# Patient Record
Sex: FEMALE | Race: WHITE | Employment: FULL TIME | URBAN - METROPOLITAN AREA
[De-identification: names, ages, dates, MRNs, and addresses within clinical notes are randomized per-mention and may not be internally consistent; named-entity substitution may affect disease eponyms.]

---

## 2021-01-19 LAB — HIV, EXTERNAL: NON REACTIVE

## 2021-05-27 LAB
HIV, EXTERNAL: NON REACTIVE
RUBELLA, EXTERNAL: NORMAL

## 2021-12-28 LAB — GRBS, EXTERNAL: NEGATIVE

## 2022-01-19 ENCOUNTER — ANESTHESIA EVENT (OUTPATIENT)
Dept: LABOR AND DELIVERY | Age: 32
End: 2022-01-19
Payer: COMMERCIAL

## 2022-01-19 ENCOUNTER — ANESTHESIA (OUTPATIENT)
Dept: LABOR AND DELIVERY | Age: 32
End: 2022-01-19
Payer: COMMERCIAL

## 2022-01-19 ENCOUNTER — HOSPITAL ENCOUNTER (OUTPATIENT)
Age: 32
Setting detail: OBSERVATION
Discharge: HOME | End: 2022-01-19
Attending: OBSTETRICS & GYNECOLOGY | Admitting: OBSTETRICS & GYNECOLOGY
Payer: COMMERCIAL

## 2022-01-19 ENCOUNTER — HOSPITAL ENCOUNTER (INPATIENT)
Age: 32
LOS: 2 days | Discharge: HOME | End: 2022-01-21
Attending: OBSTETRICS & GYNECOLOGY | Admitting: OBSTETRICS & GYNECOLOGY
Payer: COMMERCIAL

## 2022-01-19 VITALS
SYSTOLIC BLOOD PRESSURE: 130 MMHG | HEART RATE: 86 BPM | TEMPERATURE: 98.1 F | OXYGEN SATURATION: 100 % | BODY MASS INDEX: 21.77 KG/M2 | RESPIRATION RATE: 17 BRPM | DIASTOLIC BLOOD PRESSURE: 87 MMHG | WEIGHT: 147 LBS | HEIGHT: 69 IN

## 2022-01-19 PROBLEM — Z3A.40 40 WEEKS GESTATION OF PREGNANCY: Status: ACTIVE | Noted: 2022-01-19

## 2022-01-19 PROBLEM — Z33.1 IUP (INTRAUTERINE PREGNANCY), INCIDENTAL: Status: ACTIVE | Noted: 2022-01-19

## 2022-01-19 PROBLEM — Z37.9 NORMAL LABOR: Status: ACTIVE | Noted: 2022-01-19

## 2022-01-19 LAB
ABO + RH BLD: NORMAL
APPEARANCE UR: CLEAR
BASOPHILS # BLD: 0 K/UL (ref 0–0.1)
BASOPHILS NFR BLD: 0 % (ref 0–2)
BILIRUB UR QL: NEGATIVE
BLOOD GROUP ANTIBODIES SERPL: NORMAL
COLOR UR: YELLOW
DIFFERENTIAL METHOD BLD: ABNORMAL
EOSINOPHIL # BLD: 0 K/UL (ref 0–0.4)
EOSINOPHIL NFR BLD: 0 % (ref 0–5)
ERYTHROCYTE [DISTWIDTH] IN BLOOD BY AUTOMATED COUNT: 12.9 % (ref 11.6–14.5)
GLUCOSE UR QL STRIP.AUTO: NEGATIVE MG/DL
HCT VFR BLD AUTO: 39 % (ref 35–45)
HGB BLD-MCNC: 12.9 G/DL (ref 12–16)
IMM GRANULOCYTES # BLD AUTO: 0.1 K/UL (ref 0–0.04)
IMM GRANULOCYTES NFR BLD AUTO: 0 % (ref 0–0.5)
KETONES UR-MCNC: 80 MG/DL
LEUKOCYTE ESTERASE UR QL STRIP: NEGATIVE
LYMPHOCYTES # BLD: 1.4 K/UL (ref 0.9–3.6)
LYMPHOCYTES NFR BLD: 11 % (ref 21–52)
MCH RBC QN AUTO: 29.4 PG (ref 24–34)
MCHC RBC AUTO-ENTMCNC: 33.1 G/DL (ref 31–37)
MCV RBC AUTO: 88.8 FL (ref 78–100)
MONOCYTES # BLD: 0.8 K/UL (ref 0.05–1.2)
MONOCYTES NFR BLD: 6 % (ref 3–10)
NEUTS SEG # BLD: 10.2 K/UL (ref 1.8–8)
NEUTS SEG NFR BLD: 82 % (ref 40–73)
NITRITE UR QL: NEGATIVE
NRBC # BLD: 0 K/UL (ref 0–0.01)
NRBC BLD-RTO: 0 PER 100 WBC
PH UR: 6 [PH] (ref 5–9)
PLATELET # BLD AUTO: 223 K/UL (ref 135–420)
PMV BLD AUTO: 10.3 FL (ref 9.2–11.8)
PROT UR QL: NEGATIVE MG/DL
RBC # BLD AUTO: 4.39 M/UL (ref 4.2–5.3)
RBC # UR STRIP: NEGATIVE /UL
SERVICE CMNT-IMP: ABNORMAL
SP GR UR: >1.03 (ref 1–1.02)
SPECIMEN EXP DATE BLD: NORMAL
UROBILINOGEN UR QL: 0.2 EU/DL (ref 0.2–1)
WBC # BLD AUTO: 12.4 K/UL (ref 4.6–13.2)

## 2022-01-19 PROCEDURE — 74011000258 HC RX REV CODE- 258

## 2022-01-19 PROCEDURE — 81003 URINALYSIS AUTO W/O SCOPE: CPT

## 2022-01-19 PROCEDURE — 76060000078 HC EPIDURAL ANESTHESIA

## 2022-01-19 PROCEDURE — 86900 BLOOD TYPING SEROLOGIC ABO: CPT

## 2022-01-19 PROCEDURE — 74011250636 HC RX REV CODE- 250/636: Performed by: ADVANCED PRACTICE MIDWIFE

## 2022-01-19 PROCEDURE — 74011250636 HC RX REV CODE- 250/636: Performed by: ANESTHESIOLOGY

## 2022-01-19 PROCEDURE — 10907ZC DRAINAGE OF AMNIOTIC FLUID, THERAPEUTIC FROM PRODUCTS OF CONCEPTION, VIA NATURAL OR ARTIFICIAL OPENING: ICD-10-PCS | Performed by: OBSTETRICS & GYNECOLOGY

## 2022-01-19 PROCEDURE — 99285 EMERGENCY DEPT VISIT HI MDM: CPT

## 2022-01-19 PROCEDURE — 65270000029 HC RM PRIVATE

## 2022-01-19 PROCEDURE — 74011250637 HC RX REV CODE- 250/637: Performed by: OBSTETRICS & GYNECOLOGY

## 2022-01-19 PROCEDURE — 74011250636 HC RX REV CODE- 250/636

## 2022-01-19 PROCEDURE — 74011000250 HC RX REV CODE- 250: Performed by: ANESTHESIOLOGY

## 2022-01-19 PROCEDURE — 85025 COMPLETE CBC W/AUTO DIFF WBC: CPT

## 2022-01-19 PROCEDURE — 75410000000 HC DELIVERY VAGINAL/SINGLE

## 2022-01-19 PROCEDURE — 0UQGXZZ REPAIR VAGINA, EXTERNAL APPROACH: ICD-10-PCS | Performed by: OBSTETRICS & GYNECOLOGY

## 2022-01-19 PROCEDURE — 0UQMXZZ REPAIR VULVA, EXTERNAL APPROACH: ICD-10-PCS | Performed by: OBSTETRICS & GYNECOLOGY

## 2022-01-19 PROCEDURE — 74011000258 HC RX REV CODE- 258: Performed by: ANESTHESIOLOGY

## 2022-01-19 PROCEDURE — 77030007879 HC KT SPN EPDRL TELE -B: Performed by: ANESTHESIOLOGY

## 2022-01-19 PROCEDURE — 00HU33Z INSERTION OF INFUSION DEVICE INTO SPINAL CANAL, PERCUTANEOUS APPROACH: ICD-10-PCS | Performed by: ANESTHESIOLOGY

## 2022-01-19 PROCEDURE — 74011000250 HC RX REV CODE- 250: Performed by: ADVANCED PRACTICE MIDWIFE

## 2022-01-19 PROCEDURE — 75410000003 HC RECOV DEL/VAG/CSECN EA 0.5 HR

## 2022-01-19 PROCEDURE — 75410000002 HC LABOR FEE PER 1 HR

## 2022-01-19 PROCEDURE — 74011250636 HC RX REV CODE- 250/636: Performed by: OBSTETRICS & GYNECOLOGY

## 2022-01-19 RX ORDER — ONDANSETRON 2 MG/ML
4 INJECTION INTRAMUSCULAR; INTRAVENOUS
Status: DISCONTINUED | OUTPATIENT
Start: 2022-01-19 | End: 2022-01-20 | Stop reason: HOSPADM

## 2022-01-19 RX ORDER — DIPHENHYDRAMINE HYDROCHLORIDE 50 MG/ML
25 INJECTION, SOLUTION INTRAMUSCULAR; INTRAVENOUS
Status: DISCONTINUED | OUTPATIENT
Start: 2022-01-19 | End: 2022-01-20 | Stop reason: HOSPADM

## 2022-01-19 RX ORDER — FENTANYL CITRATE 50 UG/ML
100 INJECTION, SOLUTION INTRAMUSCULAR; INTRAVENOUS ONCE
Status: DISCONTINUED | OUTPATIENT
Start: 2022-01-19 | End: 2022-01-20 | Stop reason: HOSPADM

## 2022-01-19 RX ORDER — FACIAL-BODY WIPES
10 EACH TOPICAL DAILY PRN
Status: CANCELLED | OUTPATIENT
Start: 2022-01-19

## 2022-01-19 RX ORDER — SIMETHICONE 80 MG
80 TABLET,CHEWABLE ORAL
Status: CANCELLED | OUTPATIENT
Start: 2022-01-19

## 2022-01-19 RX ORDER — NALOXONE HYDROCHLORIDE 0.4 MG/ML
0.2 INJECTION, SOLUTION INTRAMUSCULAR; INTRAVENOUS; SUBCUTANEOUS AS NEEDED
Status: DISCONTINUED | OUTPATIENT
Start: 2022-01-19 | End: 2022-01-20 | Stop reason: HOSPADM

## 2022-01-19 RX ORDER — SODIUM CHLORIDE, SODIUM LACTATE, POTASSIUM CHLORIDE, CALCIUM CHLORIDE 600; 310; 30; 20 MG/100ML; MG/100ML; MG/100ML; MG/100ML
125 INJECTION, SOLUTION INTRAVENOUS CONTINUOUS
Status: DISCONTINUED | OUTPATIENT
Start: 2022-01-19 | End: 2022-01-20 | Stop reason: HOSPADM

## 2022-01-19 RX ORDER — FENTANYL CITRATE 50 UG/ML
INJECTION, SOLUTION INTRAMUSCULAR; INTRAVENOUS AS NEEDED
Status: DISCONTINUED | OUTPATIENT
Start: 2022-01-19 | End: 2022-01-19 | Stop reason: HOSPADM

## 2022-01-19 RX ORDER — FENTANYL CITRATE 50 UG/ML
INJECTION, SOLUTION INTRAMUSCULAR; INTRAVENOUS
Status: COMPLETED
Start: 2022-01-19 | End: 2022-01-19

## 2022-01-19 RX ORDER — BUTORPHANOL TARTRATE 2 MG/ML
2 INJECTION INTRAMUSCULAR; INTRAVENOUS
Status: DISCONTINUED | OUTPATIENT
Start: 2022-01-19 | End: 2022-01-20 | Stop reason: HOSPADM

## 2022-01-19 RX ORDER — LIDOCAINE HYDROCHLORIDE AND EPINEPHRINE 15; 5 MG/ML; UG/ML
INJECTION, SOLUTION EPIDURAL AS NEEDED
Status: DISCONTINUED | OUTPATIENT
Start: 2022-01-19 | End: 2022-01-19 | Stop reason: HOSPADM

## 2022-01-19 RX ORDER — OXYTOCIN/RINGER'S LACTATE 30/500 ML
10 PLASTIC BAG, INJECTION (ML) INTRAVENOUS AS NEEDED
Status: COMPLETED | OUTPATIENT
Start: 2022-01-19 | End: 2022-01-19

## 2022-01-19 RX ORDER — MINERAL OIL
30 OIL (ML) ORAL AS NEEDED
Status: COMPLETED | OUTPATIENT
Start: 2022-01-19 | End: 2022-01-19

## 2022-01-19 RX ORDER — SODIUM CHLORIDE 0.9 % (FLUSH) 0.9 %
5-40 SYRINGE (ML) INJECTION AS NEEDED
Status: DISCONTINUED | OUTPATIENT
Start: 2022-01-19 | End: 2022-01-20 | Stop reason: HOSPADM

## 2022-01-19 RX ORDER — LANOLIN ALCOHOL/MO/W.PET/CERES
3 CREAM (GRAM) TOPICAL
Status: CANCELLED | OUTPATIENT
Start: 2022-01-19

## 2022-01-19 RX ORDER — IBUPROFEN 400 MG/1
800 TABLET ORAL
Status: CANCELLED | OUTPATIENT
Start: 2022-01-22

## 2022-01-19 RX ORDER — SODIUM CHLORIDE 0.9 % (FLUSH) 0.9 %
5-40 SYRINGE (ML) INJECTION EVERY 8 HOURS
Status: CANCELLED | OUTPATIENT
Start: 2022-01-19

## 2022-01-19 RX ORDER — FENTANYL/ROPIVACAINE/NS/PF 2MCG/ML-.1
1-22 PLASTIC BAG, INJECTION (ML) EPIDURAL
Status: DISCONTINUED | OUTPATIENT
Start: 2022-01-19 | End: 2022-01-20 | Stop reason: HOSPADM

## 2022-01-19 RX ORDER — OXYTOCIN/RINGER'S LACTATE 30/500 ML
87.3 PLASTIC BAG, INJECTION (ML) INTRAVENOUS AS NEEDED
Status: CANCELLED | OUTPATIENT
Start: 2022-01-19

## 2022-01-19 RX ORDER — SODIUM CHLORIDE 0.9 % (FLUSH) 0.9 %
5-40 SYRINGE (ML) INJECTION EVERY 8 HOURS
Status: DISCONTINUED | OUTPATIENT
Start: 2022-01-19 | End: 2022-01-20 | Stop reason: HOSPADM

## 2022-01-19 RX ORDER — OXYTOCIN/0.9 % SODIUM CHLORIDE 30/500 ML
87.3 PLASTIC BAG, INJECTION (ML) INTRAVENOUS AS NEEDED
Status: COMPLETED | OUTPATIENT
Start: 2022-01-19 | End: 2022-01-19

## 2022-01-19 RX ORDER — ACETAMINOPHEN 325 MG/1
650 TABLET ORAL
Status: CANCELLED | OUTPATIENT
Start: 2022-01-19

## 2022-01-19 RX ORDER — NALBUPHINE HYDROCHLORIDE 10 MG/ML
10 INJECTION, SOLUTION INTRAMUSCULAR; INTRAVENOUS; SUBCUTANEOUS
Status: DISCONTINUED | OUTPATIENT
Start: 2022-01-19 | End: 2022-01-20 | Stop reason: HOSPADM

## 2022-01-19 RX ORDER — HYDROMORPHONE HYDROCHLORIDE 1 MG/ML
1 INJECTION, SOLUTION INTRAMUSCULAR; INTRAVENOUS; SUBCUTANEOUS
Status: DISCONTINUED | OUTPATIENT
Start: 2022-01-19 | End: 2022-01-20 | Stop reason: HOSPADM

## 2022-01-19 RX ORDER — OXYTOCIN/RINGER'S LACTATE 30/500 ML
10 PLASTIC BAG, INJECTION (ML) INTRAVENOUS AS NEEDED
Status: CANCELLED | OUTPATIENT
Start: 2022-01-19

## 2022-01-19 RX ORDER — VITAMIN A ACETATE, BETA CAROTENE, ASCORBIC ACID, CHOLECALCIFEROL, .ALPHA.-TOCOPHEROL ACETATE, DL-, THIAMINE MONONITRATE, RIBOFLAVIN, NIACINAMIDE, PYRIDOXINE HYDROCHLORIDE, FOLIC ACID, CYANOCOBALAMIN, CALCIUM CARBONATE, FERROUS FUMARATE, ZINC OXIDE, CUPRIC OXIDE 3080; 12; 120; 400; 1; 1.84; 3; 20; 22; 920; 25; 200; 27; 10; 2 [IU]/1; UG/1; MG/1; [IU]/1; MG/1; MG/1; MG/1; MG/1; MG/1; [IU]/1; MG/1; MG/1; MG/1; MG/1; MG/1
1 TABLET, FILM COATED ORAL DAILY
COMMUNITY

## 2022-01-19 RX ORDER — OXYCODONE AND ACETAMINOPHEN 5; 325 MG/1; MG/1
1-2 TABLET ORAL
Status: CANCELLED | OUTPATIENT
Start: 2022-01-19

## 2022-01-19 RX ORDER — PROMETHAZINE HYDROCHLORIDE 25 MG/ML
25 INJECTION, SOLUTION INTRAMUSCULAR; INTRAVENOUS
Status: CANCELLED | OUTPATIENT
Start: 2022-01-19

## 2022-01-19 RX ORDER — SODIUM CHLORIDE, SODIUM LACTATE, POTASSIUM CHLORIDE, CALCIUM CHLORIDE 600; 310; 30; 20 MG/100ML; MG/100ML; MG/100ML; MG/100ML
125 INJECTION, SOLUTION INTRAVENOUS CONTINUOUS
Status: CANCELLED | OUTPATIENT
Start: 2022-01-19 | End: 2022-01-20

## 2022-01-19 RX ORDER — ETHYL ALCOHOL 70 %
SOLUTION, NON-ORAL TOPICAL AS NEEDED
COMMUNITY

## 2022-01-19 RX ORDER — PHENYLEPHRINE HCL IN 0.9% NACL 1 MG/10 ML
80 SYRINGE (ML) INTRAVENOUS AS NEEDED
Status: DISCONTINUED | OUTPATIENT
Start: 2022-01-19 | End: 2022-01-20 | Stop reason: HOSPADM

## 2022-01-19 RX ORDER — FENTANYL/ROPIVACAINE/NS/PF 2MCG/ML-.1
PLASTIC BAG, INJECTION (ML) EPIDURAL
Status: COMPLETED
Start: 2022-01-19 | End: 2022-01-19

## 2022-01-19 RX ORDER — METHYLERGONOVINE MALEATE 0.2 MG/ML
0.2 INJECTION INTRAVENOUS AS NEEDED
Status: DISCONTINUED | OUTPATIENT
Start: 2022-01-19 | End: 2022-01-20 | Stop reason: HOSPADM

## 2022-01-19 RX ORDER — NALBUPHINE HYDROCHLORIDE 10 MG/ML
2.5 INJECTION, SOLUTION INTRAMUSCULAR; INTRAVENOUS; SUBCUTANEOUS
Status: DISCONTINUED | OUTPATIENT
Start: 2022-01-19 | End: 2022-01-20 | Stop reason: HOSPADM

## 2022-01-19 RX ORDER — LIDOCAINE HYDROCHLORIDE 10 MG/ML
20 INJECTION, SOLUTION EPIDURAL; INFILTRATION; INTRACAUDAL; PERINEURAL AS NEEDED
Status: DISCONTINUED | OUTPATIENT
Start: 2022-01-19 | End: 2022-01-20 | Stop reason: HOSPADM

## 2022-01-19 RX ORDER — KETOROLAC TROMETHAMINE 30 MG/ML
30 INJECTION, SOLUTION INTRAMUSCULAR; INTRAVENOUS EVERY 6 HOURS
Status: CANCELLED | OUTPATIENT
Start: 2022-01-19 | End: 2022-01-21

## 2022-01-19 RX ORDER — SODIUM CHLORIDE 0.9 % (FLUSH) 0.9 %
5-40 SYRINGE (ML) INJECTION AS NEEDED
Status: CANCELLED | OUTPATIENT
Start: 2022-01-19

## 2022-01-19 RX ORDER — TERBUTALINE SULFATE 1 MG/ML
0.25 INJECTION SUBCUTANEOUS
Status: DISCONTINUED | OUTPATIENT
Start: 2022-01-19 | End: 2022-01-20 | Stop reason: HOSPADM

## 2022-01-19 RX ADMIN — ROPIVACAINE HYDROCHLORIDE 15 ML/HR: 5 INJECTION, SOLUTION EPIDURAL; INFILTRATION; PERINEURAL at 21:13

## 2022-01-19 RX ADMIN — Medication 10000 MILLI-UNITS: at 22:58

## 2022-01-19 RX ADMIN — ROPIVACAINE HYDROCHLORIDE 15 ML/HR: 5 INJECTION, SOLUTION EPIDURAL; INFILTRATION; PERINEURAL at 15:27

## 2022-01-19 RX ADMIN — LIDOCAINE HYDROCHLORIDE,EPINEPHRINE BITARTRATE 4 ML: 15; .005 INJECTION, SOLUTION EPIDURAL; INFILTRATION; INTRACAUDAL; PERINEURAL at 17:24

## 2022-01-19 RX ADMIN — FENTANYL CITRATE 100 MCG: 50 INJECTION, SOLUTION INTRAMUSCULAR; INTRAVENOUS at 17:25

## 2022-01-19 RX ADMIN — MINERAL OIL 30 ML: 1000 SOLUTION ORAL at 23:27

## 2022-01-19 RX ADMIN — Medication 87.3 MILLI-UNITS/MIN: at 23:08

## 2022-01-19 RX ADMIN — FAMOTIDINE 20 MG: 10 INJECTION, SOLUTION INTRAVENOUS at 20:27

## 2022-01-19 NOTE — H&P
Ostetrical History and Physical    Subjective:     Date of Admission: 2022    Patient is a 36 y.o.   female admitted with normal labor at 40w. .    For Obstetric history, see eunice.    For Review of Systems, see prenatal    Past Medical History:   Diagnosis Date    H/O wisdom tooth extraction       No past surgical history on file. Prior to Admission medications    Medication Sig Start Date End Date Taking? Authorizing Provider   vitamin a & d (A&D) ointment Apply  to affected area as needed for Skin Irritation. Provider, Historical   fexofenadine (ALLEGRA) 180 mg tablet Take  by mouth. Patient not taking: Reported on 2022    Provider, Historical   drospirenone-ethinyl estradiol (QUIN) 3-0.02 mg tab take 1 tablet by mouth daily  Patient not taking: Reported on 2022 8/15/16   Swati Parikh MD     No Known Allergies   Social History     Tobacco Use    Smoking status: Never Smoker    Smokeless tobacco: Never Used   Substance Use Topics    Alcohol use: Yes     Comment: once in awhile      Family History   Problem Relation Age of Onset    Diabetes Maternal Grandfather     Heart Disease Maternal Grandfather     Hypertension Maternal Grandfather           Objective:     Blood pressure 94/60, pulse 84, temperature 98.1 °F (36.7 °C), resp. rate 16, SpO2 100 %. Temp (24hrs), Av.1 °F (36.7 °C), Min:98.1 °F (36.7 °C), Max:98.1 °F (36.7 °C)        No intake/output data recorded. No intake/output data recorded. HEENT: No pallor, no jaundice, Thyroid and throat normal  RESPIRATORY: Clear to A & P  CVS: pulse reg, HS normal  ABDOMEN: Gravid. Vertex. EFW=6.5lb +-1lb. No abnormal tenderness.    Pelvic: Cervix 4,   Effaced: 100%  Station:  -3  Data Review:   Recent Results (from the past 24 hour(s))   POC URINE MACROSCOPIC    Collection Time: 22  3:08 AM   Result Value Ref Range    Color YELLOW      Appearance CLEAR      Spec. gravity (POC) >1.030 (H) 1.001 - 1.023    pH, urine (POC) 6.0 5.0 - 9.0      Protein (POC) Negative NEG mg/dL    Glucose, urine (POC) Negative NEG mg/dL    Ketones (POC) 80 (A) NEG mg/dL    Bilirubin (POC) Negative NEG      Blood (POC) Negative NEG      Urobilinogen (POC) 0.2 0.2 - 1.0 EU/dL    Nitrite (POC) Negative NEG      Leukocyte esterase (POC) Negative NEG      Performed by [employer]      Monitor:  Reactivity:present Variability:present Baseline:within normal limits    Assessment:     Active Problems:    40 weeks gestation of pregnancy (2022)      Normal labor (2022)        Plan:       Check labs:  CBC  Check  Prenatal:    Disposition:     Type of admit:In-Patient    I saw and examined patient.     Signed By: Darcy Boyd MD                         2022

## 2022-01-19 NOTE — PROGRESS NOTES
36 y.o. pt at 40w 2d presents to L&D from office with contractions. EFM and [place] applied. IV started, labs drawn. Pt states she desires an epidural, bolus infusing. 1320: Dr. Hamlet Ely at bedside, SVE 5-6/100/0.     1445: Pt requesting epidural.     1504: Dr. Nikolas Gilmore pageyordy. 1511: Dr. Lexii Cisse at bedside discussing epidural procedure. 1517: Time out  1518: Start  1524: Catheter in, needle out  1525: Fentanyl to MD   1525: Test dose  1525: Bolus dose  1528: Finish     2491: Pt positioned on back with right hip bump. EFM and [place] adjusted. 1615: Pt turned left lateral.     1702: Dr. Hamlet Ely at bedside. AROM clear fluid. 1705: Pt placed on bedpan. 1708: Pt removed from bedpan, unable to void at this time. Plan to straight cath. 1755: Straight catheter 200ml. Pt does not want to be checked at this time. Pt placed in high fowlers position. EFM and [place] adjusted. 1808: Pt complaining of pain on right side, pt turned right lateral. EFM and [place] adjusted. 1823: CICI Arceo CNM at bedside, SVE  9.5/100/+1. Pt placed semi fowlers. : Bedside and Verbal shift change report given to LURDES Ramos (oncoming nurse) by BENNETT Silva RN (offgoing nurse). Report included the following information SBAR, Intake/Output, MAR and Recent Results.

## 2022-01-19 NOTE — PROGRESS NOTES
Labor Progress Note    Patient seen, fetal heart rate and contraction pattern evaluated. Physical Exam:  Pelvic: Cervix ant lip,   Effaced: 100%  Station:  +1     clear fluid noted on exam  Contractions: Every 2-4 minutes,60-90, severe  Fetal Heart Rate: Baseline: 130 per minute  Variability: moderate  Accelerations: yes  Decelerations: late and early  Cat II FHR strip    Assessment:  Active phase labor. and Adequate uterine activity - intensity and frequency.     PLAN:  Reassuring fetal status, Labor  Progressing normally  Continue expectant management, Continue plan for vaginal delivery    Juan Carlos Alfonso CNM  1/19/2022  6:35 PM

## 2022-01-19 NOTE — ANESTHESIA PREPROCEDURE EVALUATION
Relevant Problems   No relevant active problems       Anesthetic History   No history of anesthetic complications            Review of Systems / Medical History  Patient summary reviewed, nursing notes reviewed and pertinent labs reviewed    Pulmonary                   Neuro/Psych   Within defined limits           Cardiovascular                  Exercise tolerance: >4 METS     GI/Hepatic/Renal  Within defined limits              Endo/Other  Within defined limits           Other Findings              Physical Exam    Airway  Mallampati: II  TM Distance: 4 - 6 cm  Neck ROM: normal range of motion   Mouth opening: Normal     Cardiovascular  Regular rate and rhythm,  S1 and S2 normal,  no murmur, click, rub, or gallop             Dental  No notable dental hx       Pulmonary  Breath sounds clear to auscultation               Abdominal  GI exam deferred       Other Findings            Anesthetic Plan    ASA: 2  Anesthesia type: epidural  Risk and benefits fully explained to the patient including bleeding, headache, nerve damage, infection, nausea, back pain, and hemodynamic changes. Patient understands and agrees to the procedure.     Post-op pain plan if not by surgeon: indwelling epidural catheter      Anesthetic plan and risks discussed with: Patient

## 2022-01-19 NOTE — PROGRESS NOTES
Dr. Delym Massey paged for epidural.     1600- Charting reviewed for BENNETT Booker, [address] and notes reviewed for BENNETT Booker, RN. Bedside and Verbal shift change report given to LURDES Roy and JULIETTE Trinidad (oncoming nurse) by Kristofer Schmidt RN (offgoing nurse). Report included the following information SBAR, Intake/Output, MAR and Recent Results.

## 2022-01-19 NOTE — ANESTHESIA PROCEDURE NOTES
Epidural Block    Patient location during procedure: OB  Start time: 1/19/2022 3:18 PM  End time: 1/19/2022 3:25 PM  Reason for block: labor epidural  Staffing  Performed: attending   Anesthesiologist: Danyelle Keane MD  Preanesthetic Checklist  Completed: patient identified, IV checked, site marked, risks and benefits discussed, surgical consent, monitors and equipment checked, pre-op evaluation and timeout performed  Block Placement  Patient position: sitting  Prep: ChloraPrep  Sterility prep: cap, drape, gloves, hand and mask  Sedation level: no sedation  Patient monitoring: frequent blood pressure checks  Approach: midline  Location: lumbar  Lumbar location: L2-L3  Epidural  Loss of resistance technique: saline  Guidance: landmark technique  Needle  Needle type: Tuohy   Needle gauge: 17 G  Needle length: 9 cm  Needle insertion depth: 21 cm  Catheter type: end hole  Catheter size: 20 G  Catheter at skin depth: 21 cm  Catheter securement method: clear occlusive dressing and surgical tape  Test dose: negative  Assessment  Block outcome: pain improved  Number of attempts: 1  Procedure assessment: patient tolerated procedure well with no immediate complications

## 2022-01-19 NOTE — PROGRESS NOTES
[phone] Paged  JULIETTE Bueno [facility] to update on Pt's admission and status. Orders received for oral hydration and keep monitoring. [phone] Paged [address] updating on pt's status and no change in cervix, reassuring fetal monitor pattern. Discharge orders received   I have reviewed verbal and written orders discharge instructions with the patient. The patient verbalized understanding.

## 2022-01-19 NOTE — PROGRESS NOTES
Epidural in and working    Monitor:  Reactivity:present Variability:present Baseline:within normal limits  Contractions q 3    Vitals:  Blood pressure 122/85, pulse 80, temperature 98.1 °F (36.7 °C), resp. rate 16, SpO2 100 %.      Pelvic exam:  Cervix 7 BOW bulging    AROM, clear    Cervix now 7cm, well applied   Effaced: 100%Station: +1       Plan:           Signed By: Miko aWlker MD                         2022

## 2022-01-19 NOTE — PROGRESS NOTES
HPI:    Subjective:contractions reported, denies VB or LOF, reports good FM     Sherrell Lopez, a 36 y.o.  G1 at 40w2d presents to L&D with c/o see above. Assessment:  Past Medical History:   Diagnosis Date    H/O wisdom tooth extraction      No past surgical history on file. No Known Allergies  Prior to Admission medications    Medication Sig Start Date End Date Taking? Authorizing Provider   vitamin a & d (A&D) ointment Apply  to affected area as needed for Skin Irritation. Yes Provider, Historical   fexofenadine (ALLEGRA) 180 mg tablet Take  by mouth. Patient not taking: Reported on 1/19/2022    Provider, Historical   drospirenone-ethinyl estradiol (QUIN) 3-0.02 mg tab take 1 tablet by mouth daily  Patient not taking: Reported on 1/19/2022 8/15/16   Geraldine Colindres MD        Objective:     Vitals:  Vitals:    01/19/22 0302 01/19/22 0308 01/19/22 0315   Pulse: 83     Resp: 17     Temp: 98.1 °F (36.7 °C)     SpO2: 100%  98%   Weight:  66.7 kg (147 lb)    Height:  5' 9\" (1.753 m)         Physical Exam:  Patient without distress. Abdomen: soft, nontender  Fundus: soft and non tender  Perineum: blood absent, amniotic fluid absent  Cervical Exam: 2 cm dilated    80% effaced    -2 station    Presenting Part: cephalic  Cervical Position: mid position  Consistency: Medium  Membranes:  Intact  Fetal Heart Rate: Cat I fetal tracing   Baseline: 120 per minute  Variability: moderate  Accelerations: yes  Decelerations: none  Uterine contractions: irregular, every 2-6 minutes  Uterine irritability: no  Cervical exam: Dilated:  2 cm                             Effacement: 80 %                             Station: -2  U/A: Urine dipstick shows negative for all components. S.G. 1.030    Assessment/Plan: no e/o active labor, reassuring fetal status     Plan: DC home with standard & ER labor precautions. Follow-up in office for routine visit.      Signed By:  Daniel Cruz CNM     January 19, 2022

## 2022-01-20 LAB
HCT VFR BLD AUTO: 35.2 % (ref 35–45)
HGB BLD-MCNC: 11.7 G/DL (ref 12–16)

## 2022-01-20 PROCEDURE — 36415 COLL VENOUS BLD VENIPUNCTURE: CPT

## 2022-01-20 PROCEDURE — 85018 HEMOGLOBIN: CPT

## 2022-01-20 PROCEDURE — 74011250637 HC RX REV CODE- 250/637: Performed by: ADVANCED PRACTICE MIDWIFE

## 2022-01-20 PROCEDURE — 65270000029 HC RM PRIVATE

## 2022-01-20 RX ORDER — LANOLIN ALCOHOL/MO/W.PET/CERES
3 CREAM (GRAM) TOPICAL
Status: DISCONTINUED | OUTPATIENT
Start: 2022-01-20 | End: 2022-01-21 | Stop reason: HOSPADM

## 2022-01-20 RX ORDER — IBUPROFEN 400 MG/1
800 TABLET ORAL 3 TIMES DAILY
Status: DISCONTINUED | OUTPATIENT
Start: 2022-01-20 | End: 2022-01-21 | Stop reason: HOSPADM

## 2022-01-20 RX ORDER — AMOXICILLIN 250 MG
1 CAPSULE ORAL
Status: DISCONTINUED | OUTPATIENT
Start: 2022-01-20 | End: 2022-01-21 | Stop reason: HOSPADM

## 2022-01-20 RX ORDER — ACETAMINOPHEN 325 MG/1
650 TABLET ORAL
Status: DISCONTINUED | OUTPATIENT
Start: 2022-01-20 | End: 2022-01-21 | Stop reason: HOSPADM

## 2022-01-20 RX ORDER — PROMETHAZINE HYDROCHLORIDE 25 MG/ML
25 INJECTION, SOLUTION INTRAMUSCULAR; INTRAVENOUS
Status: DISCONTINUED | OUTPATIENT
Start: 2022-01-20 | End: 2022-01-21 | Stop reason: HOSPADM

## 2022-01-20 RX ADMIN — ACETAMINOPHEN 650 MG: 325 TABLET ORAL at 07:57

## 2022-01-20 RX ADMIN — IBUPROFEN 800 MG: 400 TABLET, FILM COATED ORAL at 23:31

## 2022-01-20 RX ADMIN — IBUPROFEN 800 MG: 400 TABLET, FILM COATED ORAL at 15:38

## 2022-01-20 RX ADMIN — IBUPROFEN 800 MG: 400 TABLET, FILM COATED ORAL at 03:33

## 2022-01-20 RX ADMIN — IBUPROFEN 800 MG: 400 TABLET, FILM COATED ORAL at 09:38

## 2022-01-20 NOTE — PROGRESS NOTES
Problem: Vaginal Delivery: Day of Deliver-Laboring  Goal: Consults, if ordered  Outcome: Progressing Towards Goal  Goal: Discharge Planning  Outcome: Progressing Towards Goal     Problem: Pain  Goal: *Control of Pain  Outcome: Progressing Towards Goal     Problem: Falls - Risk of  Goal: *Absence of Falls  Description: Document Norbert Fall Risk and appropriate interventions in the flowsheet.   Outcome: Progressing Towards Goal  Note: Fall Risk Interventions:                                Problem: Patient Education: Go to Patient Education Activity  Goal: Patient/Family Education  Outcome: Progressing Towards Goal     Problem: Vaginal Delivery: Day of Delivery-Post delivery  Goal: Activity/Safety  Outcome: Progressing Towards Goal  Goal: Consults, if ordered  Outcome: Progressing Towards Goal  Goal: Nutrition/Diet  Outcome: Progressing Towards Goal  Goal: Discharge Planning  Outcome: Progressing Towards Goal  Goal: Medications  Outcome: Progressing Towards Goal  Goal: Treatments/Interventions/Procedures  Outcome: Progressing Towards Goal  Goal: *Vital signs within defined limits  Outcome: Progressing Towards Goal  Goal: *Labs within defined limits  Outcome: Progressing Towards Goal  Goal: *Hemodynamically stable  Outcome: Progressing Towards Goal  Goal: *Optimal pain control at patient's stated goal  Outcome: Progressing Towards Goal  Goal: *Participates in infant care  Outcome: Progressing Towards Goal  Goal: *Demonstrates progressive activity  Outcome: Progressing Towards Goal  Goal: *Tolerating diet  Outcome: Progressing Towards Goal

## 2022-01-20 NOTE — PROGRESS NOTES
0300 TRANSFER - IN REPORT:    Verbal report received from Vernon Lambert RN (name) on Marie Madrigal  being received from labor and delivery (unit) for routine progression of care      Report consisted of patients Situation, Background, Assessment and   Recommendations(SBAR). Information from the following report(s) SBAR, Kardex, Intake/Output and MAR was reviewed with the receiving nurse. Opportunity for questions and clarification was provided. Assessment completed upon patients arrival to unit and care assumed. 6783 Pt. Joined at bedside by significant other and baby. AAOx4. Vital signs stable. Will continue to monitor. Pain 5/10. Educated on pain management. Pain medication administered as ordered. Whiteboard updated. Oriented to unit and room. Educated on plan of care and signs and symptoms to report. No further questions on concerns at this time. Fundus firm at U -2, smal rubra lochia. No clots noted. Assessment complete. Assisted to bathroom, steady gait. Voided. Educated on and assisted with anika care. Callbell within reach. Bed in lowest position. 0443 Pt resting comfortably in bed.     0615 Pt resting in bed comfortably. Educated on feeding frequency for infant. 0715 Bedside and Verbal shift change report given to layne Carcamo Rn & JULIETTE aGines RN (oncoming nurse) by MIREILLE Donald (offgoing nurse). Report included the following information SBAR, Kardex, Intake/Output, MAR and Recent Results.

## 2022-01-20 NOTE — PROGRESS NOTES
1056: Rounded on pt. Pain reassessment completed. Pt in bed w/ no needs at this time. 6182: Shift assessment completed. Motrin administered. 9342: Breast feeding assistance provided. Infant latched at this time. 1100: infant taken to nursery for miroslava assessment. 1108: Infant returned to mother's room. 1450: Rounded on pt. Pt states pain is well controlled. No needs at this time. 1538: Reassessment completed. Motrin administered. Fundus firm, midline umbilicus -3.     7253: No needs at this time. Pt states pain is well controlled.

## 2022-01-20 NOTE — ANESTHESIA POSTPROCEDURE EVALUATION
*    1/20/2022  2:18 PM    Laboring Epidural Follow-up Note     Referring physician: Maegan Mehta MD   Patient status post vaginal delivery with labor epidural    Visit Vitals  /76 (BP 1 Location: Right upper arm, BP Patient Position: At rest;Sitting)   Pulse 66   Temp 36.4 °C (97.5 °F)   Resp 18   SpO2 100%   Breastfeeding Unknown       Epidural removed by L&D staff  No sedation, pruritis noted. Adequate analgesia. No obvious anesthesia complications    Pt checked via phone per Covid protocol.           Jessica Pichardo CRNA

## 2022-01-20 NOTE — PROGRESS NOTES
Progress Note    Patient: Jin Fofana MRN: [id number]  SSN: [id number]    YOB: 1990  Age: 36 y.o. Sex: female    ROOM:  [facility]/      Subjective:     Postpartum Day: 1            Delivery: vaginal delivery    The patient feels well. The patient denies emotional concerns. The baby is well. Baby is feeding via breast.  The patient is ambulating well. The patient  tolerating a normal diet. Flatus has been passed.     Objective:      Patient Vitals for the past 24 hrs:   BP Temp Pulse Resp SpO2   01/20/22 0257 123/76 98.3 °F (36.8 °C) 82 16 100 %   01/20/22 0145 126/70  85     01/20/22 0130 118/73  83     01/20/22 0115 124/79  87     01/20/22 0100 108/75  86     01/20/22 0045 125/60  (!) 108     01/20/22 0030 125/76  85     01/20/22 0015 122/81  87     01/20/22 0000 126/75  81     01/19/22 2345 133/81  80     01/19/22 2336 122/75  86     01/19/22 2300 124/74       01/19/22 2200 (!) 141/99  (!) 112  99 %   01/19/22 2100 139/76  89     01/19/22 2000 125/86  (!) 117     01/19/22 1930 117/75  97     01/19/22 1922     100 %   01/19/22 1919 123/87  (!) 103     01/19/22 1917 123/87 99.5 °F (37.5 °C) (!) 110 14 100 %   01/19/22 1912     100 %   01/19/22 1907     100 %   01/19/22 1902     100 %   01/19/22 1900 116/73  (!) 101     01/19/22 1857     100 %   01/19/22 1852     100 %   01/19/22 1847     100 %   01/19/22 1842     100 %   01/19/22 1837     100 %   01/19/22 1832     100 %   01/19/22 1830 122/79  88     01/19/22 1827     100 %   01/19/22 1822     99 %   01/19/22 1817     100 %   01/19/22 1812     100 %   01/19/22 1807     99 %   01/19/22 1802     100 %   01/19/22 1800 122/82  85     01/19/22 1757     100 %   01/19/22 1752     100 %   01/19/22 1747     100 %   01/19/22 1745 130/79  (!) 103     01/19/22 1742     100 %   01/19/22 1737     100 %   01/19/22 1732     100 %   01/19/22 1730 (!) 130/93  (!) 111     01/19/22 1727     100 %   01/19/22 1722     100 %   01/19/22 1717     100 %   01/19/22 1715 122/81  (!) 101     01/19/22 1712     100 %   01/19/22 1707     100 %   01/19/22 1702     100 %   01/19/22 1700 132/78  96     01/19/22 1657     100 %   01/19/22 1652     100 %   01/19/22 1647     100 %   01/19/22 1645 118/71  82     01/19/22 1642     100 %   01/19/22 1637     100 %   01/19/22 1632     100 %   01/19/22 1630 124/83  87     01/19/22 1627     100 %   01/19/22 1622     100 %   01/19/22 1617     100 %   01/19/22 1615 124/71  81     01/19/22 1614     100 %   01/19/22 1609     100 %   01/19/22 1604     100 %   01/19/22 1600 128/75  79     01/19/22 1559     100 %   01/19/22 1554     100 %   01/19/22 1549     100 %   01/19/22 1545 122/85  80     01/19/22 1544     100 %   01/19/22 1539     100 %   01/19/22 1534 124/81  79  100 %   01/19/22 1531 119/86  83     01/19/22 1529     100 %   01/19/22 1524     100 %   01/19/22 1500 130/78  81     01/19/22 1430 130/75  85     01/19/22 1416 121/85  91     01/19/22 1253     100 %   01/19/22 1249 94/60 98.1 °F (36.7 °C) 84 16 100 %   01/19/22 1248     100 %     Lochia:  appropriate   Uterine Fundus:   firm   Fundus Location:  -3   Incision:      DVT Evaluation:  No evidence of DVT seen on physical exam.  Negative Monse's sign. No cords or calf tenderness. No significant calf/ankle edema. Lab/Data Review: All lab results for the last 24 hours reviewed.   LABS: Recent Results (from the past 48 hour(s))   POC URINE MACROSCOPIC    Collection Time: 01/19/22  3:08 AM   Result Value Ref Range    Color YELLOW      Appearance CLEAR      Spec. gravity (POC) >1.030 (H) 1.001 - 1.023    pH, urine  (POC) 6.0 5.0 - 9.0      Protein (POC) Negative NEG mg/dL    Glucose, urine (POC) Negative NEG mg/dL    Ketones (POC) 80 (A) NEG mg/dL    Bilirubin (POC) Negative NEG      Blood (POC) Negative NEG      Urobilinogen (POC) 0.2 0.2 - 1.0 EU/dL    Nitrite (POC) Negative NEG      Leukocyte esterase (POC) Negative NEG      Performed by Zi Harrington    CBC WITH AUTOMATED DIFF    Collection Time: 01/19/22  1:47 PM   Result Value Ref Range    WBC 12.4 4.6 - 13.2 K/uL    RBC 4.39 4.20 - 5.30 M/uL    HGB 12.9 12.0 - 16.0 g/dL    HCT 39.0 35.0 - 45.0 %    MCV 88.8 78.0 - 100.0 FL    MCH 29.4 24.0 - 34.0 PG    MCHC 33.1 31.0 - 37.0 g/dL    RDW 12.9 11.6 - 14.5 %    PLATELET 618 878 - 253 K/uL    MPV 10.3 9.2 - 11.8 FL    NRBC 0.0 0  WBC    ABSOLUTE NRBC 0.00 0.00 - 0.01 K/uL    NEUTROPHILS 82 (H) 40 - 73 %    LYMPHOCYTES 11 (L) 21 - 52 %    MONOCYTES 6 3 - 10 %    EOSINOPHILS 0 0 - 5 %    BASOPHILS 0 0 - 2 %    IMMATURE GRANULOCYTES 0 0.0 - 0.5 %    ABS. NEUTROPHILS 10.2 (H) 1.8 - 8.0 K/UL    ABS. LYMPHOCYTES 1.4 0.9 - 3.6 K/UL    ABS. MONOCYTES 0.8 0.05 - 1.2 K/UL    ABS. EOSINOPHILS 0.0 0.0 - 0.4 K/UL    ABS. BASOPHILS 0.0 0.0 - 0.1 K/UL    ABS. IMM. GRANS. 0.1 (H) 0.00 - 0.04 K/UL    DF AUTOMATED     TYPE & SCREEN    Collection Time: 01/19/22  1:47 PM   Result Value Ref Range    Crossmatch Expiration 01/22/2022,2352     ABO/Rh(D) AB POSITIVE     Antibody screen NEG         Assessment:     Status post: Doing well postpartum vaginal delivery     Plan:     Postpartum care discussed including diet, ambulation, and actvitiy restrictions. Discharge instructions and questions answered.        Signed By: Carmel Rojas MD     January 20, 2022

## 2022-01-20 NOTE — PROGRESS NOTES
Problem: Vaginal Delivery: Day of Deliver-Laboring  Goal: Activity/Safety  Outcome: Resolved/Met  Goal: Diagnostic Test/Procedures  Outcome: Resolved/Met  Goal: Nutrition/Diet  Outcome: Resolved/Met  Goal: Medications  Outcome: Resolved/Met  Goal: Respiratory  Outcome: Resolved/Met  Goal: Treatments/Interventions/Procedures  Outcome: Resolved/Met  Goal: *Vital signs within defined limits  Outcome: Resolved/Met  Goal: *Labs within defined limits  Outcome: Resolved/Met  Goal: *Hemodynamically stable  Outcome: Resolved/Met  Goal: *Optimal pain control at patient's stated goal  Outcome: Resolved/Met     Problem: Pain  Goal: *Control of Pain  Outcome: Progressing Towards Goal     Problem: Falls - Risk of  Goal: *Absence of Falls  Description: Document Norbert Fall Risk and appropriate interventions in the flowsheet.   Outcome: Progressing Towards Goal  Note: Fall Risk Interventions:

## 2022-01-20 NOTE — L&D DELIVERY NOTE
Delivery Summary    Patient: Disha Christensen MRN: [id number]  SSN: [id number]    YOB: 1990  Age: 36 y.o. Sex: female       Information for the patient's :  Ponciano Boast [[id number]]       Labor Events:    Labor: No    Steroids: None   Cervical Ripening Date/Time:       Cervical Ripening Type: None   Antibiotics During Labor: No   Rupture Identifier:      Rupture Date/Time: 2022 5:02 PM   Rupture Type: AROM   Amniotic Fluid Volume: None    Amniotic Fluid Description: Clear    Amniotic Fluid Odor: None    Induction: None       Induction Date/Time:        Indications for Induction:      Augmentation: AROM   Augmentation Date/Time: 25:02 PM   Indications for Augmentation: Ineffective Contraction Pattern   Labor complications: None       Additional complications:        Delivery Events:  Indications For Episiotomy:     Episiotomy: None   Perineal Laceration(s): None   Repaired:     Periurethral Laceration Location:      Repaired:     Labial Laceration Location: bilateral   Repaired: Yes   Sulcal Laceration Location:     Repaired:     Vaginal Laceration Location: right   Repaired: Yes   Cervical Laceration Location:     Repaired:     Repair Suture: Vicryl 3-0   Number of Repair Packets: 1   Estimated Blood Loss (ml):  ml   Quantitative Blood Loss (ml)                Delivery Date: 2022    Delivery Time: 10:50 PM  Delivery Type: Vaginal, Spontaneous  Sex:  Female    Gestational Age: 41w4d   Delivery Clinician:  Henrik Rowe  Living Status:     Delivery Location:              APGARS  One minute Five minutes Ten minutes   Skin color:   0   1      Heart rate:   2   2      Grimace:   2   2      Muscle tone:   2   2      Breathin   2      Totals:   8   9          Presentation: Vertex    Position:        Resuscitation Method:  Suctioning-bulb; Tactile Stimulation     Meconium Stained: Terminal      Cord Information: 3 Vessels  Complications: None  Cord around:    Delayed cord clamping? Yes  Cord clamped date/time:2022 10:54 PM  Disposition of Cord Blood: Lab    Blood Gases Sent?: No    Placenta:  Date/Time: 2022 10:58 PM  Removal: Spontaneous      Appearance: Normal      Measurements:  Birth Weight:        Birth Length:        Head Circumference:        Chest Circumference:       Abdominal Girth: Other Providers:   ;Gricel Aleman, SIMÓN Dawson, Obstetrician;Primary Nurse;Primary  Nurse;Nicu Nurse;Neonatologist;Anesthesiologist;Crna;Nurse Practitioner;Midwife;Nursery Nurse         Group B Strep:   Lab Results   Component Value Date/Time    GrBStrep, External negative 2021 12:00 AM     Information for the patient's :  Artis Matias [[id number]]   No results found for: ABORH, PCTABR, PCTDIG, BILI, ABORHEXT, ABORH     No results for input(s): PCO2CB, PO2CB, HCO3I, SO2I, IBD, PTEMPI, SPECTI, PHICB, ISITE, IDEV, IALLEN in the last 72 hours.      Vaginal Delivery Procedure Note    Name: Rashel Oh   Medical Record Number: [id number]   YOB: 1990  Today's Date: 2022        Procedure: VAGINAL DELIVERY without suction or forceps     Anesthesia:  epidural    Extra Procedure Details:       Estimated Blood Loss: 250 mL    Fetal Description: lopez female     Anterior shoulder: left    Umbilical Cord: 3 vessels present and Cord blood sent to lab for type, Rh, and Herminio' test    Episiotomy: no   Tear: yes  Type: bilateral labial, right vaginal repaired with 3-0 vicryl CT1             Cord Blood Results:   Information for the patient's :  Artis Matias [[id number]]   @AB@     Birth Information:   Information for the patient's :  Artis Matias [[id number]]        Apgars: 8,9 at 1 and 5 min respectively    Specimens: Placenta was not sent     Placenta: Spontaneous with assist and apparently intact on exam Complications:  none     Birth Weight: see baby part of chart    Mother's Condition: good  Baby's Condition: stable  Sponge and needle count: correct    I was present for the delivery. Delivered for our practice.     Signed: Lucy Flannery CNM      January 19, 2022

## 2022-01-20 NOTE — LACTATION NOTE
This note was copied from a baby's chart. 1524 mom in the bathroom. Will return. 0 Mom educated on breastfeeding basics--hunger cues, feeding on demand, waking baby if baby sleeps too long between feeds, importance of skin to skin, positioning and latching, risk of pacifier use and supplemental feedings, and importance of rooming in--and use of log sheet. Mom also educated on benefits of breastfeeding for herself and baby. Mom verbalized understanding. No questions at this time. Per mom, infant latches and nursing well with the nipple shield. Mom stated  latches better on the left breast. Mom stated right nipple is flatter and slightly inverted. Discussed nipple shield usage. Supply and demand was discussed. Encouraged to begin feeds at the breast, then to follow with the bottle if desired. Normal DOL behaviors were discussed. Mom verbalized understanding. Encouraged to call for assistance.

## 2022-01-20 NOTE — PROGRESS NOTES
Problem: Pain  Goal: *Control of Pain  Outcome: Progressing Towards Goal     Problem: Vaginal Delivery: Day of Delivery-Post delivery  Goal: Activity/Safety  Outcome: Progressing Towards Goal  Goal: Nutrition/Diet  Outcome: Progressing Towards Goal  Goal: Medications  Outcome: Progressing Towards Goal  Goal: Treatments/Interventions/Procedures  Outcome: Progressing Towards Goal  Goal: *Vital signs within defined limits  Outcome: Progressing Towards Goal  Goal: *Labs within defined limits  Outcome: Progressing Towards Goal  Goal: *Hemodynamically stable  Outcome: Progressing Towards Goal  Goal: *Optimal pain control at patient's stated goal  Outcome: Progressing Towards Goal  Goal: *Participates in infant care  Outcome: Progressing Towards Goal  Goal: *Demonstrates progressive activity  Outcome: Progressing Towards Goal  Goal: *Tolerating diet  Outcome: Progressing Towards Goal

## 2022-01-20 NOTE — PROGRESS NOTES
0715: Bedside and verbal shift change report given to CHHAYA Carcamo, RN and JULIETTE Le, RN  by MIREILLE Herman RN . Assumed care of pt at this time. 1910: Bedside and verbal shift change report given by Sagar Morales, RN & JULIETTE Le, RN to MIRTHA Brown RN.  Relinquished care of pt at this time

## 2022-01-20 NOTE — PROGRESS NOTES
[id number] Pt continues to push during contractions. This RN and CNM remain at bedside providing continuous labor support, continuously assessing patient, vital signs, fetal heart rate, contraction pattern, progress of labor and descent, adjusting EFM and palpating abdomen and contractions as needed. Jose F Hernandez MD on way for vacuum. 2250 Delivery of viable, female infant. 0255 TRANSFER - OUT REPORT:    Verbal report given to D. Valentino Sings, RN (name) on [facility]  being transferred to mother/baby (unit) for routine progression of care       Report consisted of patients Situation, Background, Assessment and   Recommendations(SBAR). Information from the following report(s) SBAR, Kardex, Intake/Output, MAR and Recent Results was reviewed with the receiving nurse. Lines:   Peripheral IV 01/19/22 Posterior;Right Forearm (Active)        Opportunity for questions and clarification was provided.       Patient transported with:   Registered Nurse

## 2022-01-21 VITALS
TEMPERATURE: 98 F | SYSTOLIC BLOOD PRESSURE: 130 MMHG | RESPIRATION RATE: 17 BRPM | HEART RATE: 77 BPM | OXYGEN SATURATION: 96 % | DIASTOLIC BLOOD PRESSURE: 87 MMHG

## 2022-01-21 PROCEDURE — 74011250637 HC RX REV CODE- 250/637: Performed by: ADVANCED PRACTICE MIDWIFE

## 2022-01-21 RX ADMIN — IBUPROFEN 800 MG: 400 TABLET, FILM COATED ORAL at 08:50

## 2022-01-21 NOTE — PROGRESS NOTES
2300 Bedside shift change report given to John Bautista RN  (oncoming nurse) by Masood Cowan RN (offgoing nurse). Report included the following information SBAR, Intake/Output, MAR and Recent Results. 0120 Pt sleeping with call bell in reach. 0320 Pt feeding infant. 0345 Pt fundus midline and firm at U-2 with small rubra lochia. 0445 Pt resting. 0550 Pt sleeping with call bell in reach. 0700 Bedside shift change report given to Laura Parker RN and Annie Ndiaye RN (oncoming nurse) by John Bautista. RN (offgoing nurse). Report included the following information SBAR, Intake/Output, MAR and Recent Results.

## 2022-01-21 NOTE — DISCHARGE INSTRUCTIONS
Patient Education        After Your Delivery (the Postpartum Period): Care Instructions  Your Care Instructions     Congratulations on the birth of your baby. Like pregnancy, the  period can be a time of excitement, alton, and exhaustion. You may look at your wondrous little baby and feel happy. You may also be overwhelmed by your new sleep hours and new responsibilities. At first, babies often sleep during the days and are awake at night. They do not have a pattern or routine. They may make sudden gasps, jerk themselves awake, or look like they have crossed eyes. These are all normal, and they may even make you smile. In these first weeks after delivery, try to take good care of yourself. It may take 4 to 6 weeks to feel like yourself again, and possibly longer if you had a  birth. You will likely feel very tired for several weeks. Your days will be full of ups and downs, but lots of alton as well. Follow-up care is a key part of your treatment and safety. Be sure to make and go to all appointments, and call your doctor if you are having problems. It's also a good idea to know your test results and keep a list of the medicines you take. How can you care for yourself at home? Take care of your body after delivery  · Use pads instead of tampons for the bloody flow that may last as long as 2 weeks. · Ease cramps with ibuprofen (Advil, Motrin). · Ease soreness of hemorrhoids and the area between your vagina and rectum with ice compresses or witch hazel pads. · Ease constipation by drinking lots of fluid and eating high-fiber foods. Ask your doctor about over-the-counter stool softeners. · Cleanse yourself with a gentle squeeze of warm water from a bottle instead of wiping with toilet paper. · Take a sitz bath in warm water several times a day. · Wear a good nursing bra. Ease sore and swollen breasts with warm, wet washcloths.   · If you aren't breastfeeding, use ice rather than heat for breast soreness. · Your period may not start for several months if you are breastfeeding. You may bleed more, and longer at first, than you did before you got pregnant. · Wait until you are healed (about 4 to 6 weeks) before you have sex. Ask your doctor when it is okay for you to have sex. · Try not to travel with your baby for 5 or 6 weeks. If you take a long car trip, make frequent stops to walk around and stretch. Avoid exhaustion  · Rest every day. Try to nap when your baby naps. · Ask another adult to be with you for a few days after delivery. · Plan for  if you have other children. · Stay flexible so you can eat at odd hours and sleep when you need to. Both you and your baby are making new schedules. · Plan small trips to get out of the house. Change can make you feel less tired. · Ask for help with housework, cooking, and shopping. Remind yourself that your job is to care for your baby. Know about help for postpartum depression  · \"Baby blues\" are common for the first 1 to 2 weeks after birth. You may cry or feel sad or irritable for no reason. · Rest whenever you can. Being tired makes it harder to handle your emotions. · Go for walks with your baby. · Talk to your partner, friends, and family about your feelings. · If your symptoms last for more than a few weeks, or if you feel very depressed, ask your doctor for help. · Postpartum depression can be treated. Support groups and counseling can help. Sometimes medicine can also help. Stay healthy  · Eat healthy foods so you have more energy. · If you breastfeed, avoid drugs. If you quit smoking during pregnancy, try to stay smoke-free. If you choose to have a drink now and then, have only one drink, and limit the number of occasions that you have a drink. Wait to breastfeed at least 2 hours after you have a drink to reduce the amount of alcohol the baby may get in the milk.   · Start daily exercise after 4 to 6 weeks, but rest when you feel tired.  · Learn exercises to tone your belly. Do Kegel exercises to regain strength in your pelvic muscles. You can do these exercises while you stand or sit. ? Squeeze the same muscles you would use to stop your urine. Your belly and thighs should not move. ? Hold the squeeze for 3 seconds, and then relax for 3 seconds. ? Start with 3 seconds. Then add 1 second each week until you are able to squeeze for 10 seconds. ? Repeat the exercise 10 to 15 times for each session. Do three or more sessions each day. · Find a class for you and your baby that has an exercise time. · If you had a  birth, give yourself a bit more time before you exercise, and be careful. When should you call for help? Call 911  anytime you think you may need emergency care. For example, call if:    · You have thoughts of harming yourself, your baby, or another person.     · You passed out (lost consciousness).     · You have chest pain, are short of breath, or cough up blood.     · You have a seizure. Call your doctor now or seek immediate medical care if:    · You have severe vaginal bleeding. This means you are passing blood clots and soaking through a pad each hour for 2 or more hours.     · You are dizzy or lightheaded, or you feel like you may faint.     · You have a fever.     · You have new or more belly pain.     · You have signs of a blood clot in your leg (called a deep vein thrombosis), such as:  ? Pain in the calf, back of the knee, thigh, or groin. ? Redness and swelling in your leg or groin.     · You have signs of preeclampsia, such as:  ? Sudden swelling of your face, hands, or feet. ? New vision problems (such as dimness, blurring, or seeing spots). ? A severe headache.    Watch closely for changes in your health, and be sure to contact your doctor if:    · Your vaginal bleeding seems to be getting heavier.     · You have new or worse vaginal discharge.     · You feel sad, anxious, or hopeless for more than a few days.     · You do not get better as expected. Where can you learn more? Go to http://www.[employer].com/  Enter A461 in the search box to learn more about \"After Your Delivery (the Postpartum Period): Care Instructions. \"  Current as of: June 16, 2021               Content Version: 13.0  © 2006-2021 [employer]. Care instructions adapted under license by [employer] (which disclaims liability or warranty for this information). If you have questions about a medical condition or this instruction, always ask your healthcare professional. [place] [address] any warranty or liability for your use of this information. POST DELIVERY DISCHARGE INSTRUCTIONS    Name: Amara Antonio  YOB: 1990  Primary Diagnosis: Active Problems:    40 weeks gestation of pregnancy (1/19/2022)      Normal labor (1/19/2022)        General:     Diet/Diet Restrictions:  Eight 8-ounce glasses of fluid daily (water, juices); avoid excessive caffeine intake. Meals/snacks as desired which are high in fiber and carbohydrates and low in fat and cholesterol. Physical Activity / Restrictions / Safety:     Avoid heavy lifting, no more that 8 lbs. For 2-3 weeks; limit use of stairs to 2 times daily for the first week home. No driving for one week. Avoid intercourse 4-6 weeks, no douching or tampon use. Check with obstetrician before starting or resuming an exercise program.         Discharge Instructions/Special Treatment/Home Care Needs:     Continue prenatal vitamins. Continue to use squirt bottle with warm water on your episiotomy after each bathroom use until bleeding stops. If steri-strips applied to your incision, remove in 7-10 days. Call your doctor for the following:     Fever over 101 degrees by mouth. Vaginal bleeding heavier than a normal menstrual period or clot larger than a golf ball.   Red streaks or increased swelling of legs, painful red streaks on your breast.  Painful urination, constipation and increased pain or swelling or discharge with your incision. If you feel extremely anxious or overwhelmed. If you have thoughts of harming yourself and/or your baby. Pain Management:     Pain Management:   Take Acetaminophen (Tylenol) or Ibuprofen (Advil, Motrin), as directed for pain. Use a warm Sitz bath 3 times daily to relieve episiotomy or hemorrhoidal discomfort. Heating pad to  incision as needed. For hemorrhoidal discomfort, use Tucks and Anusol cream as needed and directed. Follow-Up Care: These are general instructions for a healthy lifestyle:    No smoking/ No tobacco products/ Avoid exposure to second hand smoke    Surgeon General's Warning:  Quitting smoking now greatly reduces serious risk to your health. Obesity, smoking, and sedentary lifestyle greatly increases your risk for illness    A healthy diet, regular physical exercise & weight monitoring are important for maintaining a healthy lifestyle    Recognize signs and symptoms of STROKE:    F-face looks uneven    A-arms unable to move or move unevenly    S-speech slurred or non-existent    T-time-call 911 as soon as signs and symptoms begin-DO NOT go       Back to bed or wait to see if you get better-TIME IS BRAIN. Patient Education          Patient Education        Vaginal Childbirth: Care Instructions  Overview     Vaginal birth means delivering a baby through the birth canal (vagina). During labor, the uterus tightens (contracts) regularly to thin and open the cervix and to push the baby out through the birth canal.  Your body will slowly heal in the next few weeks. It's easy to get too tired and overwhelmed during the first weeks after your baby is born. Changes in your hormones can shift your mood without warning. You may find it hard to meet the extra demands on your energy and time. Take it easy on yourself.   Follow-up care is a key part of your treatment and safety. Be sure to make and go to all appointments, and call your doctor if you are having problems. It's also a good idea to know your test results and keep a list of the medicines you take. How can you care for yourself at home? Vaginal bleeding and cramps  · After delivery, you will have a bloody discharge from your vagina. This will turn pink within a week and then white or yellow after about 10 days. It may last for 2 to 4 weeks or longer, until the uterus has healed. Use sanitary pads until you stop bleeding. Using pads makes it easier to monitor your bleeding. · Don't worry if you pass some blood clots, as long as they are smaller than a golf ball. If you have a tear or stitches in your vaginal area, change the pad at least every 4 hours. This will help prevent soreness and infection. · You may have cramps for the first few days after childbirth. These are normal and occur as the uterus shrinks to normal size. Take an over-the-counter pain medicine, such as acetaminophen (Tylenol), ibuprofen (Advil, Motrin), or naproxen (Aleve), for cramps. Read and follow all instructions on the label. Do not take aspirin, because it can cause more bleeding. · Do not take two or more pain medicines at the same time unless the doctor told you to. Many pain medicines have acetaminophen, which is Tylenol. Too much acetaminophen (Tylenol) can be harmful. Stitches  · If you have stitches, they will dissolve on their own and don't need to be removed. Follow your doctor's instructions for cleaning the stitched area. · Put ice or a cold pack on your painful area for 10 to 20 minutes at a time, several times a day, for the first few days. Put a thin cloth between the ice and your skin. · Sit in a few inches of warm water (sitz bath) 3 times a day and after bowel movements. The warm water helps with pain and itching. If you don't have a tub, a warm shower might help.   Breast fullness  · Your breasts may overfill (engorge) in the first few days after delivery. To help milk flow and to relieve pain, warm your breasts in the shower or by using warm, moist towels before nursing. · If you aren't nursing, don't put warmth on your breasts or touch your breasts. Wear a bra that fits well and use ice until the fullness goes away. This usually takes 2 to 3 days. · Put ice or a cold pack on your breast after nursing to reduce swelling and pain. Put a thin cloth between the ice and your skin. Activity  · Eat a balanced diet. Don't try to lose weight by cutting calories. Keep taking your prenatal vitamins, or take a multivitamin. · Get as much rest as you can. Try to take naps when your baby sleeps during the day. · Get some exercise every day. But don't do any heavy exercise until your doctor says it is okay. · Wait until you are healed (about 4 to 6 weeks) before you have sexual intercourse. Your doctor will tell you when it is okay to have sex. · If you don't want to get pregnant, talk to your doctor about birth control. You can get pregnant even before your period returns. Also, you can get pregnant while you are breastfeeding. Mental health  · It's normal to have some sadness, anxiety, sleeplessness, and mood swings after you go home. If you feel upset or hopeless for more than a few days or are having trouble doing the things you need to do, talk to your doctor. Constipation and hemorrhoids  · Drink plenty of fluids. If you have kidney, heart, or liver disease and have to limit fluids, talk with your doctor before you increase the amount of fluids you drink. · Eat plenty of fiber each day. Have a bran muffin or bran cereal for breakfast. Try eating a piece of fruit for a mid-afternoon snack. · For painful, itchy hemorrhoids, put ice or a cold pack on the area several times a day for 10 minutes at a time.  Follow this by putting a warm compress on the area for another 10 to 20 minutes or by sitting in a shallow, warm bath.  When should you call for help? Call 911  anytime you think you may need emergency care. For example, call if:    · You have thoughts of harming yourself, your baby, or another person.     · You passed out (lost consciousness).     · You have chest pain, are short of breath, or cough up blood.     · You have a seizure. Call your doctor now or seek immediate medical care if:    · You have severe vaginal bleeding.     · You are dizzy or lightheaded, or you feel like you may faint.     · You have a fever.     · You have new or more pain in your belly or pelvis.     · You have symptoms of a blood clot in your leg (called a deep vein thrombosis), such as:  ? Pain in the calf, back of the knee, thigh, or groin. ? Redness and swelling in your leg or groin.     · You have signs of preeclampsia, such as:  ? Sudden swelling of your face, hands, or feet. ? New vision problems (such as dimness, blurring, or seeing spots). ? A severe headache. Watch closely for changes in your health, and be sure to contact your doctor if:    · Your vaginal bleeding seems to be getting heavier.     · You have new or worse vaginal discharge.     · You feel sad, anxious, or hopeless for more than a few days.     · You do not get better as expected. Where can you learn more? Go to http://www.[employer].com/  Enter Q237 in the search box to learn more about \"Vaginal Childbirth: Care Instructions. \"  Current as of: June 16, 2021               Content Version: 13.0  © 2006-2021 [employer]. Care instructions adapted under license by [employer] (which disclaims liability or warranty for this information). If you have questions about a medical condition or this instruction, always ask your healthcare professional. [place] [address] any warranty or liability for your use of this information.

## 2022-01-21 NOTE — LACTATION NOTE
This note was copied from a baby's chart. 1813 infant latched and nursed for approximately 5 minutes on the right breast using the nipple shield.  became frustrated, parents fed bottle at this time. Discussed the POC to increase offering the breast at the beginning of each feeding to establish breastfeeding and milk supply. Encouraged frequent skin to skin as well. Encouraged mom to begin pumping q3 once discharged. Breastfeeding discharge teaching completed to include feeding on demand, foremilk and hindmilk importance, engorgement, mastitis, clogged ducts, pumping, breastmilk storage, and returning to work. Information given about unit and office phone numbers and encouraged mom to reach out if concerns arise. Mom verbalized understanding and no questions at this time.

## 2022-01-21 NOTE — PROGRESS NOTES
5757: VSS. Shift assessment completed. 0900: Pt's EPDS score 12. Call placed to Dr. Alyssa Spangler to make provider aware. Per MD, instruct pt to f/u in 1 week. 9116: infant taken to nursery for miroslava assessment. 4668: infant returned to mother's room. 1022: Rounded on pt. Mother, father, and infant resting w/ lights dimmed. 1420: Infant returned to mother's room after bilirubin collection. Mother and father updated on POC.     [phone]: Reassessment completed. Fundus firm, umbilicus -2. Pt denies pain. 1800: D/C teaching complete and copy of D/C teaching instruction given to pt with verbal understanding. Pt given opportunity for questions and denies comments/concerns/questions at this time. Mother and father rooming in w/ infant. Wristbands removed and shredded. Mother verbalizes understanding of f/u in 1 week.

## 2022-01-21 NOTE — PROGRESS NOTES
Progress Note    Patient: Rashard Michele MRN: [id number]  SSN: [id number]    YOB: 1990  Age: 36 y.o. Sex: female      Subjective:     Postpartum Day: 2     Delivery: vaginal delivery    The patient feels well. The patient denies emotional concerns. The baby iswell. Baby is feeding via both breast and bottle . The patient is ambulating well. The patient  tolerating a normal diet. Flatus has been passed. Objective:      No data found. LABS: No results found for this or any previous visit (from the past 24 hour(s)). Lochia:  appropriate   Uterine Fundus:   firm   Fundus Location:  -1   Incision:  no significant drainage   DVT Evaluation:  No evidence of DVT seen on physical exam.     Lab/Data Review: All lab results for the last 24 hours reviewed. Assessment:     Status post: Doing well postpartum vaginal delivery     Plan:     Postpartum care discussed including diet, ambulation, and actvitiy restrictions. Discharge instructions and questions answered for vaginal delivery.     Signed By: Oliver Carbajal MD     January 21, 2022

## 2022-01-21 NOTE — PROGRESS NOTES
0700: Bedside and verbal shift change report given to CHHAYA Carcamo RN and JULIETTE Barajas RN by Marquis Les RN . Assumed care of pt at this time. Charting reviewed for JULIETTE Barajas RN.

## 2022-01-21 NOTE — LACTATION NOTE
This note was copied from a baby's chart. Per mom, wants to breastfeed, but infant \"hasn't latched since last night and screams when trying to nurse. \" Discussed use of nipple shield, pumping to increase supply, and nipple confusion due to supplementing. Breastfeeding discharge teaching completed to include feeding on demand, foremilk and hindmilk importance, engorgement, mastitis, clogged ducts, pumping, breastmilk storage, and returning to work. Information given about unit and office phone numbers and encouraged mom to reach out if concerns arise. Mom verbalized understanding and no questions at this time.

## 2022-01-21 NOTE — PROGRESS NOTES
Problem: Vaginal Delivery: Day of Deliver-Laboring  Goal: Consults, if ordered  Outcome: Progressing Towards Goal  Goal: Discharge Planning  Outcome: Progressing Towards Goal     Problem: Vaginal Delivery: Day of Delivery-Post delivery  Goal: Activity/Safety  Outcome: Progressing Towards Goal  Goal: Consults, if ordered  Outcome: Progressing Towards Goal  Goal: Nutrition/Diet  Outcome: Progressing Towards Goal  Goal: Discharge Planning  Outcome: Progressing Towards Goal  Goal: Medications  Outcome: Progressing Towards Goal  Goal: Treatments/Interventions/Procedures  Outcome: Progressing Towards Goal  Goal: *Vital signs within defined limits  Outcome: Progressing Towards Goal  Goal: *Labs within defined limits  Outcome: Progressing Towards Goal  Goal: *Hemodynamically stable  Outcome: Progressing Towards Goal  Goal: *Optimal pain control at patient's stated goal  Outcome: Progressing Towards Goal  Goal: *Participates in infant care  Outcome: Progressing Towards Goal  Goal: *Demonstrates progressive activity  Outcome: Progressing Towards Goal  Goal: *Tolerating diet  Outcome: Progressing Towards Goal

## 2022-01-21 NOTE — PROGRESS NOTES
Problem: Pain  Goal: *Control of Pain  1/21/2022 0244 by Cathy Bryant RN  Outcome: Progressing Towards Goal  1/21/2022 0244 by Cathy Bryant RN  Outcome: Progressing Towards Goal     Problem: Vaginal Delivery: Postpartum 2  Goal: Medications  Outcome: Progressing Towards Goal  Goal: Treatments/Interventions/Procedures  Outcome: Progressing Towards Goal  Goal: Psychosocial  Outcome: Progressing Towards Goal

## 2022-01-21 NOTE — ROUTINE PROCESS
Bedside and Verbal shift change report given to Dario Donahue RN (oncoming nurse) by MIRTHA Mathews RN (offgoing nurse). Report included the following information SBAR, Kardex, Intake/Output, MAR and Recent Results.

## 2022-03-19 PROBLEM — Z3A.40 40 WEEKS GESTATION OF PREGNANCY: Status: ACTIVE | Noted: 2022-01-19

## 2022-03-19 PROBLEM — Z33.1 IUP (INTRAUTERINE PREGNANCY), INCIDENTAL: Status: ACTIVE | Noted: 2022-01-19

## 2022-03-19 PROBLEM — Z37.9 NORMAL LABOR: Status: ACTIVE | Noted: 2022-01-19

## 2024-10-30 ENCOUNTER — HOSPITAL ENCOUNTER (OUTPATIENT)
Facility: HOSPITAL | Age: 34
Setting detail: RECURRING SERIES
Discharge: HOME | End: 2024-11-02
Payer: COMMERCIAL

## 2024-10-30 PROCEDURE — 97110 THERAPEUTIC EXERCISES: CPT

## 2024-10-30 PROCEDURE — 97161 PT EVAL LOW COMPLEX 20 MIN: CPT

## 2024-10-30 PROCEDURE — 97535 SELF CARE MNGMENT TRAINING: CPT

## 2024-10-30 NOTE — PROGRESS NOTES
PT DAILY TREATMENT NOTE/NEURO EVAL 10-18      Patient Name: Renetta Lofton    Date: 10/30/2024    : 1990  Insurance: Payor: KENYETTA / Plan: [facility] [place] [place] / Product Type: *No Product type* /      Patient  verified yes     Visit #   Current / Total 1 17   Time   In / Out 1230 129     TREATMENT AREA =  Parkinsonism, unspecified [G20.C]      SUBJECTIVE  Pain Level (0-10 scale): 2  []constant []intermittent []improving []worsening []no change since onset    Any medication changes, allergies to medications, adverse drug reactions, diagnosis change, or new procedure performed?: [x] No    [] Yes (see summary sheet for update)  Subjective functional status/changes:     PLOF: ind with ambulation, ind with transfers   Limitations to PLOF: difficulty with cutting food, difficulty with balance,  Mechanism of Injury: Pt reported that she started having a hand tremor in .  Pt reported that she had a brain MRI but it was neg.  Pt reported that she started having leg tremors after her pregnancy in . pt went to a neurologist and she stated she came out with a possible diagnosis of parkinson's.  Pt is going to [facility] in a few weeks for further assessment and treatment but wants to try LSVT BIG training in the mean time.   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

## 2024-10-30 NOTE — PROGRESS NOTES
[facility]  [address] [place] [address]  Ph [phone]  Fx [phone]    Plan of Care/ Statement of Necessity for Physical Therapy Services      Patient name: Renetta Lofton Start of Care: 10/30/2024   Referral source: Ning Licea P* : 1990    Medical Diagnosis: Parkinsonism, unspecified [G20.C]   Onset Date:2020    Treatment Diagnosis: R26.89  Abnormalities of gait and mobility and M62.81  GENERAL MUSCLE WEAKNESS     Prior Hospitalization: see medical history Provider#: [id number]   Medications: Verified on Patient summary List     Comorbidities: Other: nothing significant    Prior Level of Function: ind with ambulation, ind with transfers, ind with taking care of daughter      The Plan of Care and following information is based on the information from the initial evaluation.  Assessment/ key information: 35 yo female who presents to [facility] with c/o unsteadiness on feet, muscle weakness, and tremors. Patient reported that she started having a left hand tremor in 2020.  Pt reported that she had a brain MRI but it was neg.  Pt reported that she started having leg tremors after her pregnancy in  and pt returned to [place] and was referred to neurologist.  Pt went to a neurologist and she stated she came out with a possible diagnosis of parkinson's.  Pt is going to [facility] in a few weeks for further assessment and treatment but wants to try LSVT BIG training in the mean time.  Patient reports difficulty with cutting food, difficulty with balance, and difficulty with getting her daughter dressed in the morning.  Pt reports a general level of fatigue through out the day.  Patient demonstrates decreased ROM in left shoulder, decreased strength across various muscle groups, impaired posture, pain, impaired gait mechanics of decreased left foot clearance, impaired balance and stability per miniBESTest test, decreased functional mobility tolerance, and

## 2024-11-05 ENCOUNTER — HOSPITAL ENCOUNTER (OUTPATIENT)
Facility: HOSPITAL | Age: 34
Setting detail: RECURRING SERIES
Discharge: HOME | End: 2024-11-08
Payer: COMMERCIAL

## 2024-11-05 PROCEDURE — 97112 NEUROMUSCULAR REEDUCATION: CPT

## 2024-11-05 PROCEDURE — 97535 SELF CARE MNGMENT TRAINING: CPT

## 2024-11-05 PROCEDURE — 97530 THERAPEUTIC ACTIVITIES: CPT

## 2024-11-05 NOTE — PROGRESS NOTES
PHYSICAL / OCCUPATIONAL THERAPY - DAILY TREATMENT NOTE     Patient Name: Renetta Lofton    Date: 2024    : 1990  Insurance: Payor: KENYETTA / Plan: [facility] [place] / Product Type: *No Product type* /      Patient  verified Yes     Visit #   Current / Total 2 17   Time   In / Out 9:10 10:06   Pain   In / Out 0/10 0/10   Subjective Functional Status/Changes: \"I don't have any pain right now.\"   Changes to:  Allergies, Med Hx, Sx Hx?   no       TREATMENT AREA =  Parkinsonism, unspecified [G20.C]    If an interpreting service is utilized for treatment of this patient, the contents of this document represent the material reviewed with the patient via the .     OBJECTIVE    Therapeutic Procedures:  Tx Min Billable or 1:1 Min (if diff from Tx Min) Procedure, Rationale, Specifics     83930 Therapeutic Exercise (timed):  increase ROM, strength, coordination, balance, and proprioception to improve patient's ability to progress to PLOF and address remaining functional goals. (see flow sheet as applicable)    Details if applicable:       30  02882 Neuromuscular Re-Education (timed):  improve balance, coordination, kinesthetic sense, posture, core stability and proprioception to improve patient's ability to develop conscious control of individual muscles and awareness of position of extremities in order to progress to PLOF and address remaining functional goals. (see flow sheet as applicable)    Details if applicable:     16  04772 Therapeutic Activity (timed):  use of dynamic activities replicating functional movements to increase ROM, strength, coordination, balance, and proprioception in order to improve patient's ability to progress to PLOF and address remaining functional goals.  (see flow sheet as applicable)     Details if applicable:     10  18134 Self Care/Home Management (timed):  improve patient knowledge and understanding of home injury/symptom/pain management, positioning, and

## 2024-11-06 ENCOUNTER — HOSPITAL ENCOUNTER (OUTPATIENT)
Facility: HOSPITAL | Age: 34
Setting detail: RECURRING SERIES
Discharge: HOME | End: 2024-11-09
Payer: COMMERCIAL

## 2024-11-06 PROCEDURE — 97535 SELF CARE MNGMENT TRAINING: CPT

## 2024-11-06 PROCEDURE — 97112 NEUROMUSCULAR REEDUCATION: CPT

## 2024-11-06 PROCEDURE — 97530 THERAPEUTIC ACTIVITIES: CPT

## 2024-11-06 NOTE — PROGRESS NOTES
went over only first exercise  Current: Pt reports performing exercises several times since eval 11/5/24     Patient will decrease duel task TUG by 10 seconds to display MCID and progression to decreased falls risk.  Status at IE: 22 sec  Current:     Patient will improve 30 second STS test by 3 repititions to display MCID and progression to decreased falls risk.  Status at IE: 15 reps no arms   Current: Pt demonstrates good BIG sit to stands with good forward shift 11/6/24     Pt will have painfree left shoulder flexion and abduction AROM to aid in functional mechanics for ADLs.  Eval Status:   Shoulder Left Right    Shoulder Flexion 138 155   Shoulder  164         Long Term Goals: To be accomplished in 16 treatments:  Patient will increase strength to 5/5 throughout Tommy LEs to aid in return recreational activities and ADLs.  Status at IE:   Hip Left Right   Flexion 4 4   Extension 3+ 3+   Abduction 4- 4   Knee Left Right   Extension 5 5   Flexion 4+ 4+   Ankle       Dorsiflexion 5 5   Current:     Patient will increase strength to 5/5 throughout Tommy UEs to aid in return recreational activities and ADLs.  Status at IE:   Shoulder Left (0-5) Right  (0-5)   Shoulder Flexion 4 5   Shoulder ABD 3+ 4   Shoulder IR 4 4   Shoulder ER 4 4   Current:     Patient will improve MiniBESTest score to 25/28 to demonstrate decreased risk for falls.  Status at IE: 17/28  Current:     Patient will ambulate 1000ft on level surface with no AD and normalized gait of improved knee extension during stance phase and improved left foot clearance  Status at IE:tommy knee flexion during stance phase, decreased foot clearance left LE, decreased arm swing  Current:    PLAN  Yes  Continue plan of care  []  Upgrade activities as tolerated  []  Discharge due to :  []  Other:    Charli Tsang PTA    11/6/2024    10:49 AM    Future Appointments   Date Time Provider Department Center   11/6/2024  3:50 PM Charli Tsang PTA [facility]

## 2024-11-07 ENCOUNTER — HOSPITAL ENCOUNTER (OUTPATIENT)
Facility: HOSPITAL | Age: 34
Setting detail: RECURRING SERIES
Discharge: HOME | End: 2024-11-10
Payer: COMMERCIAL

## 2024-11-07 PROCEDURE — 97530 THERAPEUTIC ACTIVITIES: CPT

## 2024-11-07 PROCEDURE — 97535 SELF CARE MNGMENT TRAINING: CPT

## 2024-11-07 PROCEDURE — 97112 NEUROMUSCULAR REEDUCATION: CPT

## 2024-11-08 ENCOUNTER — HOSPITAL ENCOUNTER (OUTPATIENT)
Facility: HOSPITAL | Age: 34
Setting detail: RECURRING SERIES
Discharge: HOME | End: 2024-11-11
Payer: COMMERCIAL

## 2024-11-08 PROCEDURE — 97112 NEUROMUSCULAR REEDUCATION: CPT

## 2024-11-08 PROCEDURE — 97535 SELF CARE MNGMENT TRAINING: CPT

## 2024-11-08 PROCEDURE — 97530 THERAPEUTIC ACTIVITIES: CPT

## 2024-11-08 NOTE — PROGRESS NOTES
Note/Recertification    Short Term Goals: To be accomplished in 8 treatments:  Patient will report compliance with HEP at least 2x/day to aid in rehabilitation program.  Status at IE: given at visit 1 went over only first exercise  Current: Pt reports performing exercises several times since eval 11/5/24     Patient will decrease duel task TUG by 10 seconds to display MCID and progression to decreased falls risk.  Status at IE: 22 sec  Current:      Patient will improve 30 second STS test by 3 repititions to display MCID and progression to decreased falls risk.  Status at IE: 15 reps no arms   Current: Pt demonstrates good BIG sit to stands with good forward shift 11/6/24     Pt will have painfree left shoulder flexion and abduction AROM to aid in functional mechanics for ADLs.  Eval Status:   Shoulder Left Right    Shoulder Flexion 138 155   Shoulder  164   Current: Pt perform Overhead shoulder presses as well as focusing BIG arm movements for improved Shoulder ROM 11/8/24      Long Term Goals: To be accomplished in 16 treatments:  Patient will increase strength to 5/5 throughout Tommy LEs to aid in return recreational activities and ADLs.  Status at IE:   Hip Left Right   Flexion 4 4   Extension 3+ 3+   Abduction 4- 4   Knee Left Right   Extension 5 5   Flexion 4+ 4+   Ankle       Dorsiflexion 5 5   Current:     Patient will increase strength to 5/5 throughout Tommy UEs to aid in return recreational activities and ADLs.  Status at IE:   Shoulder Left (0-5) Right  (0-5)   Shoulder Flexion 4 5   Shoulder ABD 3+ 4   Shoulder IR 4 4   Shoulder ER 4 4   Current:     Patient will improve MiniBESTest score to 25/28 to demonstrate decreased risk for falls.  Status at IE: 17/28  Current:     Patient will ambulate 1000ft on level surface with no AD and normalized gait of improved knee extension during stance phase and improved left foot clearance  Status at IE:tommy knee flexion during stance phase, decreased foot

## 2024-11-12 ENCOUNTER — HOSPITAL ENCOUNTER (OUTPATIENT)
Facility: HOSPITAL | Age: 34
Setting detail: RECURRING SERIES
Discharge: HOME | End: 2024-11-15
Payer: COMMERCIAL

## 2024-11-12 PROCEDURE — 97112 NEUROMUSCULAR REEDUCATION: CPT

## 2024-11-12 PROCEDURE — 97530 THERAPEUTIC ACTIVITIES: CPT

## 2024-11-12 PROCEDURE — 97535 SELF CARE MNGMENT TRAINING: CPT

## 2024-11-12 NOTE — PROGRESS NOTES
PHYSICAL / OCCUPATIONAL THERAPY - DAILY TREATMENT NOTE     Patient Name: Renetta Lofton    Date: 2024    : 1990  Insurance: Payor: KENYETTA / Plan: [facility] [place] [place] / Product Type: *No Product type* /      Patient  verified Yes     Visit #   Current / Total 6 17   Time   In / Out 1:10 2:10   Pain   In / Out 0/10 0/10   Subjective Functional Status/Changes: \"I don't have any pain.\"   Changes to:  Allergies, Med Hx, Sx Hx?   no       TREATMENT AREA =  Parkinsonism, unspecified [G20.C]    If an interpreting service is utilized for treatment of this patient, the contents of this document represent the material reviewed with the patient via the .     OBJECTIVE    Therapeutic Procedures:  Tx Min Billable or 1:1 Min (if diff from Tx Min) Procedure, Rationale, Specifics     02470 Therapeutic Exercise (timed):  increase ROM, strength, coordination, balance, and proprioception to improve patient's ability to progress to PLOF and address remaining functional goals. (see flow sheet as applicable)    Details if applicable:       36  12001 Neuromuscular Re-Education (timed):  improve balance, coordination, kinesthetic sense, posture, core stability and proprioception to improve patient's ability to develop conscious control of individual muscles and awareness of position of extremities in order to progress to PLOF and address remaining functional goals. (see flow sheet as applicable)    Details if applicable:     14  56651 Therapeutic Activity (timed):  use of dynamic activities replicating functional movements to increase ROM, strength, coordination, balance, and proprioception in order to improve patient's ability to progress to PLOF and address remaining functional goals.  (see flow sheet as applicable)     Details if applicable:     10  60660 Self Care/Home Management (timed):  improve patient knowledge and understanding of home injury/symptom/pain management, positioning, and posture/ergonomics  to

## 2024-11-13 ENCOUNTER — HOSPITAL ENCOUNTER (OUTPATIENT)
Facility: HOSPITAL | Age: 34
Setting detail: RECURRING SERIES
Discharge: HOME | End: 2024-11-16
Payer: COMMERCIAL

## 2024-11-13 PROCEDURE — 97112 NEUROMUSCULAR REEDUCATION: CPT

## 2024-11-13 PROCEDURE — 97535 SELF CARE MNGMENT TRAINING: CPT

## 2024-11-13 PROCEDURE — 97530 THERAPEUTIC ACTIVITIES: CPT

## 2024-11-13 NOTE — PROGRESS NOTES
PHYSICAL / OCCUPATIONAL THERAPY - DAILY TREATMENT NOTE     Patient Name: Renetta Lofton    Date: 2024    : 1990  Insurance: Payor: KENYETTA / [place]: [facility] [place] [place] / Product Type: *No Product type* /      Patient  verified Yes     Visit #   Current / Total 7 17   Time   In / Out 11:58 12:45   Pain   In / Out 0/10 0/10   Subjective Functional Status/Changes: \"I was sore after yesterday. I feel it in my legs a little.\"   Changes to:  Allergies, Med Hx, Sx Hx?   no       TREATMENT AREA =  Parkinsonism, unspecified [G20.C]    If an interpreting service is utilized for treatment of this patient, the contents of this document represent the material reviewed with the patient via the .     OBJECTIVE  Therapeutic Procedures:  Tx Min Billable or 1:1 Min (if diff from Tx Min) Procedure, Rationale, Specifics     28644 Therapeutic Exercise (timed):  increase ROM, strength, coordination, balance, and proprioception to improve patient's ability to progress to PLOF and address remaining functional goals. (see flow sheet as applicable)    Details if applicable:       25  46082 Neuromuscular Re-Education (timed):  improve balance, coordination, kinesthetic sense, posture, core stability and proprioception to improve patient's ability to develop conscious control of individual muscles and awareness of position of extremities in order to progress to PLOF and address remaining functional goals. (see flow sheet as applicable)    Details if applicable:     12  59738 Therapeutic Activity (timed):  use of dynamic activities replicating functional movements to increase ROM, strength, coordination, balance, and proprioception in order to improve patient's ability to progress to PLOF and address remaining functional goals.  (see flow sheet as applicable)     Details if applicable:     10  57470 Self Care/Home Management (timed):  improve patient knowledge and understanding of home injury/symptom/pain management,

## 2024-11-14 ENCOUNTER — HOSPITAL ENCOUNTER (OUTPATIENT)
Facility: HOSPITAL | Age: 34
Setting detail: RECURRING SERIES
Discharge: HOME | End: 2024-11-17
Payer: COMMERCIAL

## 2024-11-14 PROCEDURE — 97112 NEUROMUSCULAR REEDUCATION: CPT

## 2024-11-14 PROCEDURE — 97535 SELF CARE MNGMENT TRAINING: CPT

## 2024-11-14 PROCEDURE — 97530 THERAPEUTIC ACTIVITIES: CPT

## 2024-11-14 NOTE — PROGRESS NOTES
management, positioning, and posture/ergonomics  to improve patient's ability to progress to PLOF and address remaining functional goals.  (see flow sheet as applicable)    Details if applicable:            Details if applicable:     53  [facility] Totals Reminder: bill using total billable min of TIMED therapeutic procedures (example: do not include dry needle or estim unattended, both untimed codes, in totals to left)  8-22 min = 1 unit; 23-37 min = 2 units; 38-52 min = 3 units; 53-67 min = 4 units; 68-82 min = 5 units   Total Total     TOTAL TREATMENT TIME:        53     [x]  Patient Education billed concurrently with other procedures   [x] Review HEP    [] Progressed/Changed HEP, detail:    [] Other detail:       Objective Information/Functional Measures/Assessment    Pt arrived in clinic demonstrate bilateral heel strike and slight UE arm sway during ambulation without initial VC's. Pt continues to perform Maximal Daily Exercises with good UE form with only occasional VC's. Pt continues to perform balance activities with some instability as well as progressing to 4x hurdles for step overs. Pt requires occasional cues for BIG heel strike during obstacle navigation with simulated child carrying. Pt reported increased fatigued and soreness in Left shoulder post therex but, not above tolerance. Pt was directed to continued with increased BIG arm swing for carry over.    Patient will continue to benefit from skilled PT / OT services to modify and progress therapeutic interventions, analyze and address functional mobility deficits, analyze and address ROM deficits, analyze and address strength deficits, analyze and address soft tissue restrictions, and analyze and cue for proper movement patterns to address functional deficits and attain remaining goals.    Progress toward goals / Updated goals:  []  See Progress Note/Recertification    Short Term Goals: To be accomplished in 8 treatments:  Patient will report compliance

## 2024-11-15 ENCOUNTER — HOSPITAL ENCOUNTER (OUTPATIENT)
Facility: HOSPITAL | Age: 34
Setting detail: RECURRING SERIES
Discharge: HOME | End: 2024-11-18
Payer: COMMERCIAL

## 2024-11-15 PROCEDURE — 97530 THERAPEUTIC ACTIVITIES: CPT

## 2024-11-15 PROCEDURE — 97112 NEUROMUSCULAR REEDUCATION: CPT

## 2024-11-15 PROCEDURE — 97535 SELF CARE MNGMENT TRAINING: CPT

## 2024-11-15 NOTE — PROGRESS NOTES
management, positioning, and posture/ergonomics  to improve patient's ability to progress to PLOF and address remaining functional goals.  (see flow sheet as applicable)    Details if applicable:            Details if applicable:     56  [facility] Totals Reminder: bill using total billable min of TIMED therapeutic procedures (example: do not include dry needle or estim unattended, both untimed codes, in totals to left)  8-22 min = 1 unit; 23-37 min = 2 units; 38-52 min = 3 units; 53-67 min = 4 units; 68-82 min = 5 units   Total Total     TOTAL TREATMENT TIME:        56     [x]  Patient Education billed concurrently with other procedures   [x] Review HEP    [] Progressed/Changed HEP, detail:    [] Other detail:       Objective Information/Functional Measures/Assessment    Pt arrived in clinic reporting no pain as well as reduced soreness in Left shoulder. Pt continues to perform Maximal Daily Exercises well with continued minor cueing for BIG UE movements with Left UE. Pt continues to demonstrate good progression of arm swing during BIG walking. Pt continues to progress with balance and stability training able to perform dual tasking on incline board for UE involvement with stability. Pt was progress to normal night time routine simulation with daughter of sitting in chair and putting child to bed. Pt demonstrates good balance and stability but, requires occasional misstep or lack of heel strike during ambulation. Pt reported increased fatigue post tx but, not above tolerance. Pt will benefit from continued therapy to address unmet goals and to return to prior level of activity.       Patient will continue to benefit from skilled PT / OT services to modify and progress therapeutic interventions, analyze and address functional mobility deficits, analyze and address ROM deficits, analyze and address strength deficits, analyze and address soft tissue restrictions, and analyze and cue for proper movement patterns to address

## 2024-11-18 ENCOUNTER — HOSPITAL ENCOUNTER (OUTPATIENT)
Facility: HOSPITAL | Age: 34
Setting detail: RECURRING SERIES
Discharge: HOME | End: 2024-11-21
Payer: COMMERCIAL

## 2024-11-18 PROCEDURE — 97530 THERAPEUTIC ACTIVITIES: CPT

## 2024-11-18 PROCEDURE — 97112 NEUROMUSCULAR REEDUCATION: CPT

## 2024-11-18 PROCEDURE — 97116 GAIT TRAINING THERAPY: CPT

## 2024-11-18 PROCEDURE — 97110 THERAPEUTIC EXERCISES: CPT

## 2024-11-18 NOTE — PROGRESS NOTES
[facility]  [address] [place] [address]  Ph [phone]  Fx [phone]    Physical Therapy Progress Note  Patient name: Renetta Lofton Start of Care: 10/30/2024   Referral source: Ning Licea P* : 1990               Medical Diagnosis: Parkinsonism, unspecified [G20.C]    Onset Date:2020               Treatment Diagnosis: R26.89  Abnormalities of gait and mobility and M62.81  GENERAL MUSCLE WEAKNESS     Prior Hospitalization: see medical history Provider#: [id number]   Medications: Verified on Patient summary List      Comorbidities: Other: nothing significant     Prior Level of Function: ind with ambulation, ind with transfers, ind with taking care of daughter    Reporting Period: 10/30/24-24    Visits from Start of Care: 10    Missed Visits: 0    Updated Goals/Measure of Progress: To be achieved in 17 treatments:    Short Term Goals: To be accomplished in 8 treatments:  Patient will report compliance with HEP at least 2x/day to aid in rehabilitation program.  Status at IE: given at visit 1 went over only first exercise  PN 2024: Pt reported that she is trying to do her exercises daily    PROGRESSING      Patient will decrease duel task TUG by 10 seconds to display MCID and progression to decreased falls risk.  Status at IE: 22 sec  PN 2024: 13 seconds    PROGRESSING      Patient will improve 30 second STS test by 3 repititions to display MCID and progression to decreased falls risk.  Status at IE: 15 reps no arms   PN 2024: 15 reps no Arms SAME      Pt will have painfree left shoulder flexion and abduction AROM to aid in functional mechanics for ADLs.  Eval Status:   Shoulder Left Right  Left  Right    Shoulder Flexion 138 155 155 170   Shoulder  164 165 165   PN 2024: see above    PROGRESSING     Long Term Goals: To be accomplished in 16 treatments:  Patient will increase strength to 5/5 throughout Tommy LEs 
17/28  PN 11/18/2024: 23/28   PROGRESSING      Patient will ambulate 1000ft on level surface with no AD and normalized gait of improved knee extension during stance phase and improved left foot clearance  Status at IE:dom knee flexion during stance phase, decreased foot clearance left LE, decreased arm swing  PN 11/18/2024: improved stance phase continued occasional left LE foot clearance, decreased arm swing that improves with cues.          PLAN  Yes  Continue plan of care  []  Upgrade activities as tolerated  []  Discharge due to :  []  Other:    Juanita Barnett PT    11/18/2024    9:09 AM    Future Appointments   Date Time Provider Department Center   11/18/2024 12:30 PM Juanita Barnett, NICO [facility]   11/19/2024  8:30 AM Charli Tsang, IRVIN [facility]   11/20/2024  1:50 PM Juanita Barnett, PT [facility]   11/21/2024  7:50 AM [facility]   11/25/2024 11:10 AM [facility]   11/26/2024  8:30 AM Charli Tsang, PTA [facility]   11/27/2024  9:50 AM Juanita Barnett, PT [facility]   11/29/2024  1:50 PM Juanita Barnett, PT [facility]

## 2024-11-19 ENCOUNTER — HOSPITAL ENCOUNTER (OUTPATIENT)
Facility: HOSPITAL | Age: 34
Setting detail: RECURRING SERIES
Discharge: HOME | End: 2024-11-22
Payer: COMMERCIAL

## 2024-11-19 PROCEDURE — 97535 SELF CARE MNGMENT TRAINING: CPT

## 2024-11-19 PROCEDURE — 97530 THERAPEUTIC ACTIVITIES: CPT

## 2024-11-19 PROCEDURE — 97112 NEUROMUSCULAR REEDUCATION: CPT

## 2024-11-19 NOTE — PROGRESS NOTES
PHYSICAL / OCCUPATIONAL THERAPY - DAILY TREATMENT NOTE     Patient Name: Renetta Lofton    Date: 2024    : 1990  Insurance: Payor: KENYETTA / Plan: NAN KUMARI [place] / Product Type: *No Product type* /      Patient  verified Yes     Visit #   Current / Total 11 17   Time   In / Out 8:33 9:26   Pain   In / Out 0/10 0/10   Subjective Functional Status/Changes: \"I don't have any pain. I feel pretty good. I'm trying to work on my BIG walking and swinging my arms.\"   Changes to:  Allergies, Med Hx, Sx Hx?   no       TREATMENT AREA =  Parkinsonism, unspecified [G20.C]    If an interpreting service is utilized for treatment of this patient, the contents of this document represent the material reviewed with the patient via the .     OBJECTIVE    Therapeutic Procedures:  Tx Min Billable or 1:1 Min (if diff from Tx Min) Procedure, Rationale, Specifics     64275 Therapeutic Exercise (timed):  increase ROM, strength, coordination, balance, and proprioception to improve patient's ability to progress to PLOF and address remaining functional goals. (see flow sheet as applicable)    Details if applicable:       31  21581 Neuromuscular Re-Education (timed):  improve balance, coordination, kinesthetic sense, posture, core stability and proprioception to improve patient's ability to develop conscious control of individual muscles and awareness of position of extremities in order to progress to PLOF and address remaining functional goals. (see flow sheet as applicable)    Details if applicable:     12  82381 Therapeutic Activity (timed):  use of dynamic activities replicating functional movements to increase ROM, strength, coordination, balance, and proprioception in order to improve patient's ability to progress to PLOF and address remaining functional goals.  (see flow sheet as applicable)     Details if applicable:     10  65886 Self Care/Home Management (timed):  improve patient knowledge and understanding of

## 2024-11-20 ENCOUNTER — HOSPITAL ENCOUNTER (OUTPATIENT)
Facility: HOSPITAL | Age: 34
Setting detail: RECURRING SERIES
Discharge: HOME | End: 2024-11-23
Payer: COMMERCIAL

## 2024-11-20 PROCEDURE — 97530 THERAPEUTIC ACTIVITIES: CPT

## 2024-11-20 PROCEDURE — 97110 THERAPEUTIC EXERCISES: CPT

## 2024-11-20 PROCEDURE — 97112 NEUROMUSCULAR REEDUCATION: CPT

## 2024-11-20 PROCEDURE — 97116 GAIT TRAINING THERAPY: CPT

## 2024-11-20 NOTE — PROGRESS NOTES
PHYSICAL / OCCUPATIONAL THERAPY - DAILY TREATMENT NOTE     Patient Name: Renetta Lofton    Date: 2024    : 1990  Insurance: Payor: VIRIDIANA KUMARI / [place]: NAN KUMARI [place] / Product Type: *No Product type* /      Patient  verified Yes     Visit #   Current / Total 12 17   Time   In / Out 156 256   Pain   In / Out 0 0   Subjective Functional Status/Changes: Pt reported that she has seen improvements in how she walks    Changes to:  Allergies, Med Hx, Sx Hx?   no       TREATMENT AREA =  Parkinsonism, unspecified [G20.C]    If an interpreting service is utilized for treatment of this patient, the contents of this document represent the material reviewed with the patient via the .     OBJECTIVE    Therapeutic Procedures:  Tx Min Billable or 1:1 Min (if diff from Tx Min) Procedure, Rationale, Specifics   15  03784 Therapeutic Exercise (timed):  increase ROM, strength, coordination, balance, and proprioception to improve patient's ability to progress to PLOF and address remaining functional goals. (see flow sheet as applicable)    Details if applicable:       15  24099 Neuromuscular Re-Education (timed):  improve balance, coordination, kinesthetic sense, posture, core stability and proprioception to improve patient's ability to develop conscious control of individual muscles and awareness of position of extremities in order to progress to PLOF and address remaining functional goals. (see flow sheet as applicable)    Details if applicable:     15  85620 Therapeutic Activity (timed):  use of dynamic activities replicating functional movements to increase ROM, strength, coordination, balance, and proprioception in order to improve patient's ability to progress to PLOF and address remaining functional goals.  (see flow sheet as applicable)     Details if applicable:     15  41376 Gait Training (timed):    300 feet with no AD (assistive device) over even surfaces with sup level of assist. Cuing for

## 2024-11-21 ENCOUNTER — APPOINTMENT (OUTPATIENT)
Facility: HOSPITAL | Age: 34
End: 2024-11-21
Payer: COMMERCIAL

## 2024-11-25 ENCOUNTER — HOSPITAL ENCOUNTER (OUTPATIENT)
Facility: HOSPITAL | Age: 34
Setting detail: RECURRING SERIES
Discharge: HOME | End: 2024-11-28
Payer: COMMERCIAL

## 2024-11-25 PROCEDURE — 97530 THERAPEUTIC ACTIVITIES: CPT

## 2024-11-25 PROCEDURE — 97112 NEUROMUSCULAR REEDUCATION: CPT

## 2024-11-25 PROCEDURE — 97535 SELF CARE MNGMENT TRAINING: CPT

## 2024-11-25 NOTE — PROGRESS NOTES
PHYSICAL / OCCUPATIONAL THERAPY - DAILY TREATMENT NOTE     Patient Name: Renetta Lofton    Date: 2024    : 1990  Insurance: Payor: VIRIDIANA KUMARI / [place]: NAN KUMARI [place] / Product Type: *No Product type* /      Patient  verified Yes     Visit #   Current / Total 13 17   Time   In / Out 11:16 12:09   Pain   In / Out 0/10 0/10   Subjective Functional Status/Changes: \"I don't have any pain but, my back is sore. I think its all the walking I did over the weekend.\"   Changes to:  Allergies, Med Hx, Sx Hx?   no       TREATMENT AREA =  Parkinsonism, unspecified [G20.C]    If an interpreting service is utilized for treatment of this patient, the contents of this document represent the material reviewed with the patient via the .     OBJECTIVE    Therapeutic Procedures:  Tx Min Billable or 1:1 Min (if diff from Tx Min) Procedure, Rationale, Specifics     07020 Therapeutic Exercise (timed):  increase ROM, strength, coordination, balance, and proprioception to improve patient's ability to progress to PLOF and address remaining functional goals. (see flow sheet as applicable)    Details if applicable:       31  17381 Neuromuscular Re-Education (timed):  improve balance, coordination, kinesthetic sense, posture, core stability and proprioception to improve patient's ability to develop conscious control of individual muscles and awareness of position of extremities in order to progress to PLOF and address remaining functional goals. (see flow sheet as applicable)    Details if applicable:     12  29762 Therapeutic Activity (timed):  use of dynamic activities replicating functional movements to increase ROM, strength, coordination, balance, and proprioception in order to improve patient's ability to progress to PLOF and address remaining functional goals.  (see flow sheet as applicable)     Details if applicable:     10  38838 Self Care/Home Management (timed):  improve patient knowledge and understanding of

## 2024-11-26 ENCOUNTER — HOSPITAL ENCOUNTER (OUTPATIENT)
Facility: HOSPITAL | Age: 34
Setting detail: RECURRING SERIES
Discharge: HOME | End: 2024-11-29
Payer: COMMERCIAL

## 2024-11-26 PROCEDURE — 97112 NEUROMUSCULAR REEDUCATION: CPT

## 2024-11-26 PROCEDURE — 97530 THERAPEUTIC ACTIVITIES: CPT

## 2024-11-26 NOTE — PROGRESS NOTES
detail:       Objective Information/Functional Measures/Assessment    Pt arrived in clinic reporting no pain at this time. Pt received shortened tx due to pt arriving late. Pt continues to demonstrate good tolerance to Maximal Daily Exercises on Airex pad with good stability and occasional BIG cueing for Left UE. Pt demonstrates improved BIG UE movements with ambulation but, continues to demonstrate loss of sequencing during BIG walking. Pt continues to demonstrate difficulty with shoulder Lifts overhead and wall angels for lateral movements. Pt reported increased fatigue post tx but, no increased pain. Pt will benefit from continued therapy to address unmet goals and to return to prior level of activity.       Patient will continue to benefit from skilled PT / OT services to modify and progress therapeutic interventions, analyze and address functional mobility deficits, analyze and address ROM deficits, analyze and address strength deficits, analyze and address soft tissue restrictions, analyze and cue for proper movement patterns, and analyze and modify for postural abnormalities to address functional deficits and attain remaining goals.    Progress toward goals / Updated goals:  []  See Progress Note/Recertification    Short Term Goals: To be accomplished in 8 treatments:  Patient will report compliance with HEP at least 2x/day to aid in rehabilitation program.  Status at IE: given at visit 1 went over only first exercise  PN 11/18/2024: Pt reported that she is trying to do her exercises daily    PROGRESSING      Patient will decrease duel task TUG by 10 seconds to display MCID and progression to decreased falls risk.  Status at IE: 22 sec  PN 11/18/2024: 13 seconds    PROGRESSING      Patient will improve 30 second STS test by 3 repititions to display MCID and progression to decreased falls risk.  Status at IE: 15 reps no arms   PN 11/18/2024: 15 reps no Arms SAME   Current: Pt demonstrates good sit to stand on

## 2024-11-27 ENCOUNTER — HOSPITAL ENCOUNTER (OUTPATIENT)
Facility: HOSPITAL | Age: 34
Setting detail: RECURRING SERIES
End: 2024-11-27
Payer: COMMERCIAL

## 2024-11-27 ENCOUNTER — TELEPHONE (OUTPATIENT)
Facility: HOSPITAL | Age: 34
End: 2024-11-27

## 2024-11-27 NOTE — PROGRESS NOTES
PHYSICAL / OCCUPATIONAL THERAPY - DAILY TREATMENT NOTE     Patient Name: Renetta Lofton    Date: 2024    : 1990  Insurance: Payor: VIRIDIANA KUMARI / [place]: NAN KUMARI [place] / Product Type: *No Product type* /      Patient  verified Yes     Visit #   Current / Total 15 17   Time   In / Out *** ***   Pain   In / Out *** ***   Subjective Functional Status/Changes: ***   Changes to:  Allergies, Med Hx, Sx Hx?   {YES/NO DIET:267602341}       TREATMENT AREA =  Parkinsonism, unspecified [G20.C]    If an interpreting service is utilized for treatment of this patient, the contents of this document represent the material reviewed with the patient via the .     OBJECTIVE    Therapeutic Procedures:  Tx Min Billable or 1:1 Min (if diff from Tx Min) Procedure, Rationale, Specifics   ***  {[facility] Procedures (Optional):95420}    Details if applicable:       ***  {[facility] Procedures (Optional):90290}    Details if applicable:     ***  {[facility] Procedures (Optional):81669}     Details if applicable:       {[facility] Procedures (Optional):67090}    Details if applicable:       {[facility] Procedures (Optional):86586}     Details if applicable:     ***  [facility] Totals Reminder: bill using total billable min of TIMED therapeutic procedures (example: do not include dry needle or estim unattended, both untimed codes, in totals to left)  8-22 min = 1 unit; 23-37 min = 2 units; 38-52 min = 3 units; 53-67 min = 4 units; 68-82 min = 5 units   Total Total     TOTAL TREATMENT TIME:        ***     [x]  Patient Education billed concurrently with other procedures   [x] Review HEP    [] Progressed/Changed HEP, detail:    [] Other detail:       Objective Information/Functional Measures/Assessment    Patient Presentation:  ***  Assessment for treatment and intervention:   ***  Suggestions for next visit:  Progress as tolerated  Patient tolerated today's session well.   Patient is making good progress towards

## 2024-11-29 ENCOUNTER — HOSPITAL ENCOUNTER (OUTPATIENT)
Facility: HOSPITAL | Age: 34
Setting detail: RECURRING SERIES
Discharge: HOME | End: 2024-12-02
Payer: COMMERCIAL

## 2024-11-29 PROCEDURE — 97112 NEUROMUSCULAR REEDUCATION: CPT

## 2024-11-29 PROCEDURE — 97530 THERAPEUTIC ACTIVITIES: CPT

## 2024-11-29 PROCEDURE — 97110 THERAPEUTIC EXERCISES: CPT

## 2024-11-29 NOTE — PROGRESS NOTES
PHYSICAL / OCCUPATIONAL THERAPY - DAILY TREATMENT NOTE     Patient Name: Renetta Lofton    Date: 2024    : 1990  Insurance: Payor: VIRIDIANA KUMARI / [place]: NAN KUMARI [place] / Product Type: *No Product type* /      Patient  verified Yes     Visit #   Current / Total 15 17   Time   In / Out 155 250   Pain   In / Out 0 0   Subjective Functional Status/Changes: Pt reported that she is ready for DC    Changes to:  Allergies, Med Hx, Sx Hx?   no       TREATMENT AREA =  Parkinsonism, unspecified [G20.C]    If an interpreting service is utilized for treatment of this patient, the contents of this document represent the material reviewed with the patient via the .     OBJECTIVE    Therapeutic Procedures:  Tx Min Billable or 1:1 Min (if diff from Tx Min) Procedure, Rationale, Specifics   15  12617 Therapeutic Exercise (timed):  increase ROM, strength, coordination, balance, and proprioception to improve patient's ability to progress to PLOF and address remaining functional goals. (see flow sheet as applicable)    Details if applicable:       8  02731 Neuromuscular Re-Education (timed):  improve balance, coordination, kinesthetic sense, posture, core stability and proprioception to improve patient's ability to develop conscious control of individual muscles and awareness of position of extremities in order to progress to PLOF and address remaining functional goals. (see flow sheet as applicable)    Details if applicable:     32  01604 Therapeutic Activity (timed):  use of dynamic activities replicating functional movements to increase ROM, strength, coordination, balance, and proprioception in order to improve patient's ability to progress to PLOF and address remaining functional goals.  (see flow sheet as applicable)     Details if applicable:     55  [facility] Totals Reminder: bill using total billable min of TIMED therapeutic procedures (example: do not include dry needle or estim unattended, both untimed

## 2024-11-29 NOTE — PROGRESS NOTES
[facility]  [address] [place] [address]  Ph [phone]  Fx [phone]    Physical Therapy Discharge Summary    Patient name: Renetta Lofton Start of Care: 10/30/2024   Referral source: Ning Licea P* : 1990               Medical Diagnosis: Parkinsonism, unspecified [G20.C]    Onset Date:2020               Treatment Diagnosis: R26.89  Abnormalities of gait and mobility and M62.81  GENERAL MUSCLE WEAKNESS     Prior Hospitalization: see medical history Provider#: [id number]   Medications: Verified on Patient summary List      Comorbidities: Other: nothing significant     Prior Level of Function: ind with ambulation, ind with transfers, ind with taking care of daughter    Visits from Start of Care: 15    Missed Visits: 1    Reporting Period : 10/30/24 to 24    Goals/Measure of Progress:  Short Term Goals: To be accomplished in 8 treatments:  Patient will report compliance with HEP at least 2x/day to aid in rehabilitation program.  Status at IE: given at visit 1 went over only first exercise  PN 2024: Pt reported that she is trying to do her exercises daily    PROGRESSING   24; Pt is performing HEP 1 time a day this is ok for continuing into DC from services.  PROGRESSING      Patient will decrease duel task TUG by 10 seconds to display MCID and progression to decreased falls risk.  Status at IE: 22 sec  PN 2024: 13 seconds    PROGRESSING   24: 12 sec GOAL MET      Patient will improve 30 second STS test by 3 repititions to display MCID and progression to decreased falls risk.  Status at IE: 15 reps no arms   PN 2024: 15 reps no Arms SAME   24: 16 reps PROGRESSING      Pt will have painfree left shoulder flexion and abduction AROM to aid in functional mechanics for ADLs.  Eval Status:   Shoulder Left Right  Left  Right  Left 24 Right 24    Shoulder Flexion 138 155 155 170 162 164   Shoulder ABD